# Patient Record
Sex: MALE | NOT HISPANIC OR LATINO | Employment: PART TIME | URBAN - METROPOLITAN AREA
[De-identification: names, ages, dates, MRNs, and addresses within clinical notes are randomized per-mention and may not be internally consistent; named-entity substitution may affect disease eponyms.]

---

## 2018-07-30 ENCOUNTER — TRANSFERRED RECORDS (OUTPATIENT)
Dept: HEALTH INFORMATION MANAGEMENT | Facility: CLINIC | Age: 23
End: 2018-07-30

## 2019-01-09 ENCOUNTER — TRANSFERRED RECORDS (OUTPATIENT)
Dept: HEALTH INFORMATION MANAGEMENT | Facility: CLINIC | Age: 24
End: 2019-01-09

## 2019-08-14 ENCOUNTER — TELEPHONE (OUTPATIENT)
Dept: PSYCHIATRY | Facility: CLINIC | Age: 24
End: 2019-08-14

## 2019-08-14 NOTE — TELEPHONE ENCOUNTER
PSYCHIATRY CLINIC PHONE INTAKE     SERVICES REQUESTED / INTERESTED IN          Med Management    Presenting Problem and Brief History                              What would you like to be seen for? (brief description):  Patient has just recently moved from [place], and is looking to consolidate care closer to home. Patient is currently being medicated for all mental health diagnosis. Patient stated that his current medication management regime is not going that well, he is just needing some one closer to his home. Patient stated that he is still feeling symptomatic, although does not want to take any more medications.       Have you received a mental health diagnosis? Yes   Which one (s): YAMILE, MDD  Is there any history of developmental delay?  No   Are you currently seeing a mental health provider?  Yes            Who / month last seen:  Psychiatrist Deanna Velazquez [facility] [facility]  Do you have mental health records elsewhere?  Yes  Will you sign a release so we can obtain them?  Yes    Have you ever been hospitalized for psychiatric reasons?  No  Describe:  N/A    Do you have current thoughts of self-harm?  No    Do you currently have thoughts of harming others?  No       Substance Use History     Do you have any history of alcohol / illicit drug use?  No  Describe:  N/A  Have you ever received treatment for this?  No    Describe:  N/A     Social History     Does the patient have a guardian?  Yes    Name / number: N/A  Have you had an ACT team in last 12 months?  No  Describe: N/A   Do you have any current or past legal issues?  No  Describe: N/A   OK to leave a detailed voicemail?  No    Medical/ Surgical History                                 There is no problem list on file for this patient.         Medications             No current outpatient medications on file.         DISPOSITION      Patient added to NP & Resident wait list.    Kannan Denney

## 2019-09-09 ENCOUNTER — TELEPHONE (OUTPATIENT)
Dept: PSYCHIATRY | Facility: CLINIC | Age: 24
End: 2019-09-09

## 2019-09-09 ENCOUNTER — OFFICE VISIT (OUTPATIENT)
Dept: PSYCHIATRY | Facility: CLINIC | Age: 24
End: 2019-09-09
Payer: COMMERCIAL

## 2019-09-09 DIAGNOSIS — F42.9 OBSESSIVE-COMPULSIVE DISORDER: Primary | ICD-10-CM

## 2019-09-09 DIAGNOSIS — F33.2 SEVERE EPISODE OF RECURRENT MAJOR DEPRESSIVE DISORDER, WITHOUT PSYCHOTIC FEATURES (H): ICD-10-CM

## 2019-09-09 NOTE — TELEPHONE ENCOUNTER
Writer called patient to explore his willingness to have writer call referring psychologist, Dr. Kayden Tellez, to discuss current treatment plan with that provider and to consult about additional therapy options. Writer also inquired about if patient was open to writer checking in about safety planning with Dr. Tellez.    Patient reported that he was open to writer coordinating care around additional psychotherapy and safety planning.    Patient also asked a clarifying question about what to expect with MTM appointment. Writer explained the activities of that meeting and how the service helps with the assessment process.    Writer will follow up with Dr. Tellez to coordinate care.

## 2019-09-09 NOTE — PROGRESS NOTES
[facility] Treatment Resistant Depression Program  Diagnostic Assessment  A part of the [facility] Psychiatry Mood Disorders Program    Jevon Murillo MRN# [id number]   Age: 23 year old YOB: 1995      Date of Evaluation: 9/09/19  Start Time: 10:00am; End Time: 11:30am         Care Team     PCP- Dr. Nikki Velazquez  Specialty Providers- no  Therapist- yes, Dr. Kayden Tellez  Psychiatrist- yes, Dr. Nikki Velazquez  Other Mental Health Providers- no    Jevon Murillo is a 23 year old male who prefers the name Jevon & pronouns he, him, his.    Referred by:  Dr. Kayden Tellez  Referred for evaluation of:  depression, anxiety and OCD.         Contributors to the Assessment     Chart Reviewed.   Interview completed with Jevon Murillo.  Releases of information signed by Jevon for Dr. Kayden Tellez.  Collateral information obtained from Dr. Nikki Velazquez (printed health records provided by patient).         Chief Complaint     OCD, Anxiety and Depression         History of Present Illness      Pertinent Background:      The patient reports 10-20 lifetime episodes of depression and no psychiatric hospitalizations. Patient reported he is interested in TMS treatment and other treatments to help him with OCD symptoms and subsequent depression. He is hoping to find longer-term non-pharmacological ways to treat his symptoms. His ultimate goal is to be able to manage his symptoms well enough to be able to travel and work abroad.    Patient was first diagnosed with depression at the age of 6 by a primary care provider. He was also diagnosed with Generalized Anxiety Disorder at that time. He notes that depression has occurred intermittently throughout his life and feels driven by the distress he feels related to anxiety and OCD. He notes that depression became more pronounced recently when OCD symptoms returned after he stopped taking Vyvance while working abroad about a year ago.     The patient's depressive  "symptoms currently include increased appetite, psychomotor retardation, exhaustion, feelings of guilt or worthlessness, difficulty concentrating, and thoughts of death. Suicidal ideation is present and has not included a suicide attempt.     Discussed other mental health concerns apart from depression.     With regard to Obssessive-Compulsive Disorder, patient reported onset at age 8. Examples include severe separation anxiety, and obsessions about \"bad behavior\" in the past and compulsions to seek reassurance from his parents. Obsessions and Compulsions have varied and evolved over time. Right now patient describes obsessions related to sexual and harm-based behavior toward children and animals (I.e. Worries about inappropriately petting his cat). His compulsions are more centered around mental review right now. Impairment to functioning as a child was described as difficulty going to school or engaging in activities away from his parents. Right now he is struggling to work, engage with friends, or leave home. Medications have been helpful at various times, but has also experienced several relapses in symptoms throughout his life. He notes that he had a complete remission in symptoms when taking Fluoxitine and Vyvance, and was symptom free for a five year period.    With regard to Panic Disorder, patient reported being diagnosed in 2018. Examples include panic attacks. Impairment to functioning was described as having difficulty working and leaving the house. Patient notes that panic symptoms are directly related to distress he feels as a result of OCD. He describes that panic symptoms will build for days while he experiences OCD symptoms.    With regard to ADD, patient reported being diagnosed at the age of 12. The patient and writer did not discuss the impact or treatment of these symptoms during the interview.    The patient also reports a diagnosis of Tic Disorder, not otherwise specified. This diagnosis was " "also not discussed throughout the interview.    Psych critical item history includes [no critical items].          Psychiatric Review of Systems (Completed M.I.N.I. Version 7.0.2: Yes)     A. DEPRESSION  Past 2 Weeks:  low mood nearly every day, anhedonia most of the time, appetite change (increase), psychomotor changes (retardation), low energy, worthlessness and/or guilt, difficulty concentrating, thinking or making decisions and suicidal ideation with plan, without intent    Past Episode:  low mood nearly every day, anhedonia most of the time, appetite change (patient notes that he has both an increase and a significant decrease.), psychomotor changes (retardation), low energy, worthlessness and/or guilt, difficulty concentrating, thinking or making decisions and suicidal ideation with plan, without intent    B. SUICIDALITY: Current: Yes, risk High  -reports 25-30% in response to \"How likely are to you to try to kill yourself within the next 3 months on a scale from 0-100%?\"  -reports current SI, denies intent and reports passive plan  -denies current SIB/Self Injurious Behavior  -denies current HI    C. ABDULLAHI/HYPOMANIA  Current Episode:  none    Past Episode:  none    D. PANIC:  Patient reports experiencing panic that is provoked by OCD symptoms.    E. AGORAPHOBIA:  Patient notes that he is currently having trouble leaving the house, but this appears to be more of an avoidance of OCD triggers.    F. SOCIAL ANXIETY:  Patient describes having social anxiety symptoms in high school, but that those have mostly resolved.    G. OBSESSIVE-COMPULSIVE:  obsessions, compulsions and examples included Patient has sexual and harm-based obsessions related to children and animals. Most specifically he has fears that he is inappropriately touching his cat. He compulsions have become more centered on mental review, but have been reassurance seeking in the past.    H. TRAUMA:  none    I. ALCOHOL & J. NON-ALCOHOL:  See below    K. " PSYCHOSIS:   Patient wonders if he experienced auditory hallucinations while withdrawing from Vyvance.    L-M. EATING DISORDER: none    N. GENERALIZED ANXIETY:  Patient's symptoms have been diagnoses as YAMILE when he was a child. He does not endorce any generalized anxiety right now.    O. RULE OUT MEDICAL, ORGANIC OR DRUG CAUSES FOR ALL DISORDERS  During any current disorder or past mood episode, patient reports:  A. Substance use or withdrawal: No  B. Medical illness: No    P. ANTISOCIAL PERSONALITY:  none     Other Cluster B Traits:  none    SUBSTANCE USE HISTORY                                                                 RECENT SUBSTANCE USE:     TOBACCO- no     CAFFEINE- coffee/ tea [will take a caffeine supplement before working out, but has not used this in the last two months.]  ALCOHOL- yes, less than monthly.     CANNABIS- yes, less than monthly.    OPIOIDS- no         NARCAN KIT- no                OTHER ILLICIT DRUGS- none    Past Use-   TOBACCO- no     CAFFEINE- see above  ALCOHOL- yes, began drinking in 2013. Does not report present or past problems with alcohol use.     CANNABIS- yes, less than monthly.    OPIOIDS- no         NARCAN KIT- no                OTHER ILLICIT DRUGS- none    CD Treatment- #- no   Most Recent- no    Medical Consequences (eg HIV/Hepatitis)- no  Legal Consequences- no     PSYCHIATRIC HISTORY     SIB- no  Suicidal Ideation Hx- yes, patient has had ongoing SI throughout his life.  Suicide Attempt- #- no, most recent- no    Violence/Aggression Hx- no    Psych Diagnoses- Past Diagnoses include: Generalized Anxiety Disorder and Depression (age 6), OCD (age 8), MDD and ADHD (age 13), Tic Disorder, not otherwise specified (age 18), Panic Disorder (age 22).  Psych Hosp- #- no, most recent- no     ECT- no  TMS- no     Outpatient Programs [ DBT, Day Treatment, Eating Disorder etc] : no, patient reports that he is on the waitlist for the IOP for OCD at [facility] SOCIAL  and FAMILY HISTORY                        patient reported                                 1ea, 1ea     CURRENT SOCIAL HISTORY:  LIVING SITUATION- Patient lives with friends.  CHILDREN- no   SOCIAL SUPPORT / RELATIONSHIPS-   Patient notes that his parents and friends are supports. He started working with a psychologist about 2 months ago.     He states that his parents are the primary people who he feel comfortable sharing his mental health symptoms with.    CULTURAL / SPIRITUAL- none provided    EDUCATION- Patient has a Bachelor's Degree  EMPLOYMENT- Patient works as a  about 20 hours per week. 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    FINANCIAL SUPPORT- Earned income    STRENGTHS / COPING- Patient notes that he uses distraction (I.e. Watching youtube) to help him cope with OCD and the subsequent anxiety and depression. When he is feeling better he tries to engage with his friends and roommates.    Patient is actively working toward recovery by working with his therapist and psychiatric provider.    Trauma History (self-report)- no    Legal- no    FAMILY MENTAL HEALTH HX- yes, his father has a history of Bipolar/Schizoaffective Disorders and ADD; mother has depression, anxiety, and PTSD; oldest brother has Bipolar Disorder; the second oldest brother has OCD and depression; sister has depression, anxiety and ADD.    PAST PSYCH MED TRIALS      Will be reviewed during MTM.    MEDICAL / SURGICAL HISTORY                                   There is no problem list on file for this patient.      No past surgical history on file.     History of seizures: no   History of head trauma/loss of consciousness: no     ALLERGY                                Patient has no allergy information on record.    MEDICATIONS                               No current outpatient medications on file.       VITALS                                                                                                                             3, 3   There were no vitals taken for this visit.     MENTAL STATUS EXAM                                                                                    9, 14 cog gs     Alertness: alert   Appearance: casually groomed  Behavior/Demeanor: cooperative, with good  eye contact   Speech: slowed  Language: intact  Psychomotor: normal or unremarkable  Mood: depressed and anxious  Affect: flat; was congruent to mood; was congruent to content  Thought Process/Associations: unremarkable  Thought Content:  Reports suicidal ideation with plan; without intent [details in Interim History];  Denies none  Perception:  Reports none;  Denies none  Insight: excellent  Judgment: excellent  Cognition: (6) fund of knowledge: appropriate    DATA     PHQ9 was completed today, 9/09/19  Scored at 17    Over the last 2 weeks, how often have you been bothered by any the following problems?    1. Little interest or pleasure in doing things: 3 - Nearly every day  2. Feeling down, depressed, or hopeless: 3 - Nearly every day  3. Trouble falling or staying asleep, or sleeping too much: 1 - Several days  4. Feeling tired or having little energy: 1 - Several days  5. Poor appetite or overeating: 3 - Nearly every day  6. Feeling bad about yourself - or that you are a failure or have let yourself or your family down: Not Answered  7. Trouble concentrating on things, such as reading the newspaper or watching television: 2 - More than half the days  8. Moving or speaking so slowly that other people could have noticed. Or the opposite-being fidgety or restless that you have been moving around a lot more than usual: 2 - More than half the days  9. Thoughts that you would be better off dead, or of hurting yourself in some way: 2 - More than half the days    If you checked off any problems, how difficulty have these problems made it for you to do your work, take care of things at home, or get along with other people? Very difficult     GAD7 was  completed today, 9/09/19  Scored at 15    Over the last 2 weeks, how often have you been bothered by the following problems?    1. Feeling nervous, anxious or on edge: 3 - Nearly every day  2. Not being able to stop or control worrying: 3 - Nearly every day  3. Worrying too much about different things: 3 - Nearly every day  4. Trouble relaxing: 3 - Nearly every day  5. Being so restless that it is hard to sit still: 0 - Not at all  6. Becoming easily annoyed or irritable: 0 - Not at all  7. Feeling afraid as if something awful might happen: 3 - Nearly every day     CAGE-AID was completed today, 9/09/19  1. In the last three months, have you felt you should cut down or stop drinking or using drugs? no  2. In the last three months, has anyone annoyed you or gotten on your nerves by telling you to cut down or stop drinking or using drugs? no  3. In the last three months, have you felt guilty or bad about how much you drink or use drugs? no  4. In the last three months, have you been waking up wanting to have an alcoholic drink or use drugs? no    WHODAS 2.0 was completed today, 9/09/19  Scored at 25    Over the past 30 days, how much difficulty you had doing the following activities.    S1. Standing for long periods such as 30 minutes? None  S2. Taking care of your household responsibilities? Moderate  S3. Learning a new task, for examples, learning how to get a new place? Severe  S4. How much of a problem did you have joining in community activities (for example, festivities, religous or other activities) in the same way as anyone else can? Severe  S5. How much have you been emotionally affected by your health problems? Severe  S6. Concentrating on doing something for ten minutes? Moderate  S7. Walking a long distance such as a kilometre (or equivalent)? Mild  S8. Washing your whole body? Moderate  S9. Getting dressed? Mild  S10. Dealing with people you do not know? Moderate  S11. Maintaining a friendship?  Severe  S12. Your day-to-day work? Severe    H1. Overall, the past 30 days, how many days were these difficulties present? [30]  H2. In the past 30 days, for how many days were you totally unable to carry out your usual activities or work because of any health condition? [15]  H3. In the past 30 days, not counting the days that you were totally unable, for how many days did you cut back or reduce your usual activities or work because of any health condition? [30]     PSYCHIATRIC DIAGNOSES                                                                                               OCD, Depression     ASSESSMENT                                                                                                          m2, h3     Please note, writer did receive all pertinent medical records as of the time of this assessment. Jevon did sign DAMIÁN's for additional records.     Jevon Murillo is a 23 year old single (never )  male with a psychiatry history of Generalized Anxiety Disorder; Major Depressive Disorder; OCD; Panic Disorder; ADD; Tic Disorder, NOS who presents for a [facility] Treatment Resistant Depression program evaluation. Jevon was referred by Dr. Kayden Tellez. He has a history of no psychiatric hospitalization(s).  Family history is significant for Schizoaffective Disorder, Bipolar Disorder, OCD, ADD, depression, and anxiety.    Today, Jevon presents as a Good historian with Good insight. He estimates 10-20 lifetime episodes of depression with onset at age 6. Jevon s past and present depressive symptoms seem consistent with a diagnosis of Major Depressive Disorder, and Obsessive-Compulsive Disorder. Depressive symptoms seem to contribute to impaired functioning in the areas of work and relationships. Precipitating factors are not clear at this time. Perpetuating factors are not clear at this time. Jevon is presently participating in individual therapy to address OCD symptoms with interest in an  "Intensive Out Patient program for OCD. Jevon is also interested in TMS. Past treatment approaches include individual therapy.     In addition, the M.I.N.I. Interview scores positively for a diagnosis/diagnoses of Major Depressive Disorder, Recurrent, Severe; Suicidality, and Obsessive-Compulsive Disorder. Substance use does not seem to be a current problem. Further diagnostic clarification is needed to rule out diagnosis(es) of Panic Disorder and Generalized Anxiety Disorder.     Today, Jevon reports SI, denies intent, and reports plan. He has notable risk factors for self-harm including suicide plan [Jevon says that his SI includes a plan to use opiates to overdose. He also reports that he would also consider \"dropping out of society and becoming homeless\"].  However, risk is mitigated by no h/o suicide attempt, no h/o risky impulsive behavior, no access to lethal means, describes a safety plan, h/o seeking help when needed, none to minimal alcohol use , commitment to family, stable housing and good job situation.  Based on all available evidence he does not appear to be at imminent risk for self-harm therefore does not meet criteria for a 72-hr hold/  involuntary hospitalization.  However, based on degree of symptoms, safety planning was provided and writer voiced a desire to contact patient's therapist to discuss risk and additional referrals, which patient agreed to.  Additional steps to minimize risk include: SAFETY PLAN completed includes regular communication with psychologist (even outside of session regarding SI), contacting parents, identifying triggers and signs of risky thoughts or behaviors. Writer provided address for [place] [facility] emergency room. 24/7 crisis resources were provided in print.      PLAN                                                                                                                        m2, h3     Medications: Will be addressed during an MTM visit and/or new patient " medication management visit.     Therapy:  yes, OCD-specific exposure therapy with Dr. Kayden Tellez.    Crisis Numbers:   Provided 24/7 crisis resources including but not limited to the following:  National Suicide Prevention Hotline: 5-674-132-XQLI (8963)  Crisis Text Line: Text START to 896-391  [facility]): Dial 2-1-17 (428-141-3740)    Other Referrals:  After initial visit writer contacted patient for additional safety planning and assessed his willingness to attend day treatment program while he waits for [facility] program to open. Patient said that he was not interested in a referral to that program at this time.    RTC: For MTM visit.    JAMESON Calderón     [Billing Code 85585 for diagnostic assessment without medical services]       Attestation:    I did not see this patient directly. This patient is discussed with me in individual clinical social work supervision, and I agree with the plan as documented.     Desiree Mota, LICSW, MSW, LICSW, October 10, 2019

## 2019-09-19 ENCOUNTER — TELEPHONE (OUTPATIENT)
Dept: PSYCHIATRY | Facility: CLINIC | Age: 24
End: 2019-09-19

## 2019-09-19 NOTE — TELEPHONE ENCOUNTER
Writer called patient to provide referral to [facility] for OCD and Anxiety treatment. Patient stated that he is already on the waitlist for the program, but is concerned that it may interfere too much with work. Writer offered to refer patient to day treatment at [facility] in the meantime. Patient declined.  Writer also inquired about patient's SI. Patient stated that he communicated with his therapist about increasing SI related to exposure therapy that he is working on. He stated that SI has decreased in the last week since his therapist has decreased the intensity of the OCD intervention. Writer inquired about the safety plan that patient has in place.    Safety plan includes talking to his parents if he begins to do completion tasks associated with suicide (I.e. Selling his car; paying off debt), and he also knows to go to the psychiatric emergency room if needed.    Writer probed for details related to means that he may have access to. Patient reported that he does not have access to means. Writer probed about access to other potential means. Patient stated that he has access to benzodiazepines that he is prescribed but does not use. Writer asked if he would be willing to get rid of that medication. He agreed to throw them away today. Writer also encouraged patient to talk to his parents about his completion tasks and to let them know that if he is engaging in behaviors that resemble completion tasks that he is likely at high risk. Patient agreed to do this. Patient also described ways that he is able to distract himself from SI (I.e. Youtube and walking outside). He noted that SI has decreased in the last week and he has been able to work more, and has been leaving the house more. He said that he planned to spend time with his roommates and play video games this weekend.    Writer reminded patient of appointment with pharmacist next week. Writer will work to expedite care with psychiatrist if  possible due to acuity of symptoms.

## 2019-09-20 ASSESSMENT — PATIENT HEALTH QUESTIONNAIRE - PHQ9: 5. POOR APPETITE OR OVEREATING: NEARLY EVERY DAY

## 2019-09-20 ASSESSMENT — ANXIETY QUESTIONNAIRES
5. BEING SO RESTLESS THAT IT IS HARD TO SIT STILL: NOT AT ALL
7. FEELING AFRAID AS IF SOMETHING AWFUL MIGHT HAPPEN: NEARLY EVERY DAY
6. BECOMING EASILY ANNOYED OR IRRITABLE: NOT AT ALL
3. WORRYING TOO MUCH ABOUT DIFFERENT THINGS: NEARLY EVERY DAY
2. NOT BEING ABLE TO STOP OR CONTROL WORRYING: NEARLY EVERY DAY
1. FEELING NERVOUS, ANXIOUS, OR ON EDGE: NEARLY EVERY DAY
GAD7 TOTAL SCORE: 15

## 2019-09-21 ASSESSMENT — ANXIETY QUESTIONNAIRES: GAD7 TOTAL SCORE: 15

## 2019-09-25 ENCOUNTER — OFFICE VISIT (OUTPATIENT)
Dept: PSYCHIATRY | Facility: CLINIC | Age: 24
End: 2019-09-25
Payer: COMMERCIAL

## 2019-09-25 VITALS
WEIGHT: 182 LBS | DIASTOLIC BLOOD PRESSURE: 58 MMHG | SYSTOLIC BLOOD PRESSURE: 118 MMHG | TEMPERATURE: 98.1 F | HEART RATE: 73 BPM

## 2019-09-25 DIAGNOSIS — F42.9 OBSESSIVE-COMPULSIVE DISORDER: Primary | ICD-10-CM

## 2019-09-25 DIAGNOSIS — F33.2 SEVERE EPISODE OF RECURRENT MAJOR DEPRESSIVE DISORDER, WITHOUT PSYCHOTIC FEATURES (H): ICD-10-CM

## 2019-09-25 ASSESSMENT — PAIN SCALES - GENERAL: PAINLEVEL: NO PAIN (0)

## 2019-09-25 ASSESSMENT — PATIENT HEALTH QUESTIONNAIRE - PHQ9: SUM OF ALL RESPONSES TO PHQ QUESTIONS 1-9: 14

## 2019-09-26 NOTE — PROGRESS NOTES
"Service Date: 2019      MEDICATION THERAPY MANAGEMENT FOR  PHYSICIAN TRD PROGRAM      DICTATION IDENTIFIER:  Jevon Murillo   : 1995   MRN: [id number]      DATE OF VISIT:  2019      IDENTIFYING INFORMATION AND INTRODUCTION:  Jevon is a 23-year-old .  He is a male seen in clinic today for an  Physicians [facility] consultation.  He lives in [place], [place].  He is a new adult to the  Physician TRD program.    Psychiatrist is Dr. Bryna Hughes.  He will be seen on 10/25/2019.      CHIEF COMPLAINTS:  Depression, anxiety and OCD.      REASON FOR CONSULTATION:  Rating medication trials for antidepressant failure and assessment of antidepressant drugs and their history.      Informants include the patient.      Time spent was 1 hour without the patient and 1 hour with the patient.      MEDICATION INFORMATION:   1.  Current Antidepressant Medications:   a.  Fluoxetine, generic, 60 mg in the morning.   b.  Lorazepam 0.5 mg p.r.n.  He took it in high school and he got it last year and he threw it away. \" He did not want to get addicted\".      2.  Concomitant Medications:   a.  Ibuprofen p.r.n.      3.  Herbal Remedies:   a.  Fish oil concentrate 2 capsules 3 times a day.   b.  B complex.   c.  Vitamin C 1000 mg twice a day.   d.  Inositol powder.       4.  Currently Reported Side Effects:   a.  A little nausea and a little headache, but it is fine.      The patient had Gene testing done 18 scanned in 19 media.      ALLERGIES:  No known drug allergies.      PAST MEDICAL HISTORY:   1.  Depression.   2.  OCD.   3.  Anxiety and panic attacks.   4.  Tic diagnosis, but he says it is actually OCD.   5.  Irritable bowel syndrome.   6.  Migraine headaches.   7.  ADHD in high school.      DEPRESSION HISTORY:   1.  The patient might have had more than 20 lifetime episodes of depression.   2.  First time experienced depression and anxiety around age 6.   3.  First time OCD experienced " "at age 8.   4.  Panic attack, diagnosed in 2018.   5.  First time antidepressant drugs were started:  After a diagnosis at age 13-14.  At the same time also ADHD was diagnosed.   6.  Last episode started in [facility] around 06/2018, 2 weeks after a fast Vyvanse taper.  The patient did not have medication there and had to taper it during 2-week period and in 06/2018, he got exacerbation of his depression and OCD and each month it got worse and 2 weeks ago, he really hit bottom.  Being off Vyvanse unleashed a new OCD obsession and it is a sexual, mental obsession that he says is so horrible; it is much worse than while on Vyvanse.  His OCD was checking himself, handwashing, and it is even not comparable now.   7.  Hospitalization for severe suicidal ideation or suicide attempts:  No.   8.  Currently suicidal ideation:  Yes, some passive plans, but nothing really actively.   For more, see Rebekah Núñez's dictation.   9.  CBT:  He had \"yes,\" it was effective, \"yes\" for the OCD, but the depression got worse.   10.  He had different psychotherapies for 10 years , between the age of 9-18.      SOCIAL AND ENVIRONMENTAL ASPECTS:  He lives with his roommates and their 2 cats.  His roommates are from high school and one is from elementary school.      PHARMACOTHERAPY INDICATORS:   1.  The patient has prescription coverage with his insurance plan.  Prescription drug co-payment is 20%.  When his prescriber prescribed Luvox, even the fluvoxamine, the generic, his insurance did not cover because, according to them, patient has not failed 2 more SSRIs and this is not true.  The patient has failed 2 SSRIs and that is correct for depression, but for OCD, fluvoxamine is actually the drug of choice.  Because of that, he increased his Prozac to 60 mg and has not started fluvoxamine.  Cost of obtaining prescribed medication does interfere with compliance, if it is more than $100 per month.     2.  Patient's pharmacy:  [facility] in [address].   3.  Compliance Assessment:  Shows the patient is independent in medication administration.  He is a good historian.  He does not use a pillbox.  He misses medication rarely.  His parents are available to assist the patient.             HABITS AND CHEMICAL USE:   a.  Alcohol:  He has not used anything for 1-1/2 months, but he is kind of a social  binge drinker.  He started in 2013.  During that time, he would have 8-10 beers in one sitting.  Now if he drinks, he might have 4-5 beers, but as said for 1-1/2 months, he is alcohol free and I told him for TMS, he needs to be off the alcohol and he says it would not be a problem.   b.  Tobacco:  Never.   c. Caffeine:  He will have 120 mg caffeine supplement before a workout, and too much will give him an increase in anxiety.  He has had nothing for 2 months.   d.  Recreational drugs:  He smoked marijuana 3 months ago.  Sometimes it calms, sometimes anxiety is increased, 1-2 per year now.  In college, he would smoke 5-6 times per year.  Never medical marijuana.      RATING OF ANTIDEPRESSANT DRUGS:    1.  Selective serotonin reuptake inhibitors (SSRIs):   a.  Escitalopram/Lexapro:  Yes, 06/2009-02/2010.  Max dose 20 mg per day.  It decreased anxiety, but increased OCD.  Rating, would give it a 4 for depression.     b.  Fluoxetine/Prozac:  Yes, 03/2010 to present.  Max dose 60 mg per day.  It did not decrease  OCD or anxiety.  It decreased depression.  For OCD, it could be pushed up to 80, but target dose is 60 and so far it has not done anything for his OCD.     c.  Paroxetine/Paxil:  It was the first drug ever used as a child in 02/2003 until 06/2003.  It was beneficial for anxiety, but not for depression, and it was discontinued due to black box warning that it might increase suicidal thoughts.     d.  The patient was off medication from 06/2003 until 06/2009.      2.  Stimulants:     a.  Concerta:  Yes, 7612-3459.  No change.     b.   Dextroamphetamine-amphetamine/Adderall:  Yes.  Max dose 30 mg per day.  It was too strong for the patient.  He got hyper and his anxiety went up.     c.  Lisdexamfetamine/Vyvanse.  Max dose 40 mg per day between 9198-2571.  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  He says it is a different kind of OCD and it is much more scary and awful than his locking up things and washing his hands.  The patient did actually very well on Prozac and Vyvanse for 6 years.  His anxiety now off Vyvanse is worse.      3.  Benzodiazepines:     a.  Lorazepam:  Yes.  0.5 mg tablets up to 3 times a day.  It was given to him in 2010 and again in 2018.  He took it in 2010 twice a day every other day, and it made him tired but also relaxed.  When it was given to him last year, he threw it out because he did not like the tiredness and getting hooked on something.      4.  Miscellaneous:   a.  Buspirone/BuSpar:  Yes.  Max dose 30 mg per day.  It was used in 2010.  No data.     b.  Omega 3, triglycerides, fish oil.  Still using it.      5.  Miscellaneous sleep aids:   a.  Melatonin in 2013.  Used during college days and it worked.      The patient normally goes into the gym, but he hurt his shoulder, so he is currently not having workouts.      Bright lights:  Yes, they do not work for him.  He gets more depressed in the winter.      COMMENTS:  The patient is interested in TMS, and he also wanted to know if insurance would not cover it, what would be the price for it.      The patient will be seen by Dr. Bryan Hughes for recommendation and future treatment options.      Thanks for the opportunity to participate in the care of this patient.      Cortney Castillo, Zhane   Pharmaceutical Care Coordinator         CORTNEY CASTILLO, ZHANE             D: 09/26/2019   T: 09/26/2019   MT: al      Name:      CONRADO, GABY   MRN:      [id number]        Account:      [id number]   :      1995           Service Date: 2019      Document: [id number]

## 2019-10-25 ENCOUNTER — MEDICAL CORRESPONDENCE (OUTPATIENT)
Dept: HEALTH INFORMATION MANAGEMENT | Facility: CLINIC | Age: 24
End: 2019-10-25

## 2019-10-25 ENCOUNTER — OFFICE VISIT (OUTPATIENT)
Dept: PSYCHIATRY | Facility: CLINIC | Age: 24
End: 2019-10-25
Payer: COMMERCIAL

## 2019-10-25 VITALS
TEMPERATURE: 98 F | BODY MASS INDEX: 23.7 KG/M2 | HEART RATE: 74 BPM | DIASTOLIC BLOOD PRESSURE: 76 MMHG | WEIGHT: 178.8 LBS | HEIGHT: 73 IN | SYSTOLIC BLOOD PRESSURE: 122 MMHG

## 2019-10-25 DIAGNOSIS — F42.2 MIXED OBSESSIONAL THOUGHTS AND ACTS: Primary | ICD-10-CM

## 2019-10-25 ASSESSMENT — MIFFLIN-ST. JEOR: SCORE: 1854.91

## 2019-10-25 ASSESSMENT — PATIENT HEALTH QUESTIONNAIRE - PHQ9: SUM OF ALL RESPONSES TO PHQ QUESTIONS 1-9: 18

## 2019-10-25 ASSESSMENT — PAIN SCALES - GENERAL: PAINLEVEL: NO PAIN (0)

## 2019-10-25 NOTE — PROGRESS NOTES
" [facility]  [address], [address]  [place] [address]  New Patient Evaluation       Jevon Murillo is a 23 year old male who prefers the name Jevon and pronoun he, him, his.  Psychiatrist: MAXIME Masters  Therapist: Kayden Tellez  PCP: No primary care provider on file.  Referred by  Kayden Tellez for evaluation of depression, anxiety and OCD     History was provided by patient who was a good historian.  I also reviewed diagnostic assessment by Rebekah Rock and MTM review by Cortney Castillo.      Chief Complaint                                                                                                        \" We should talk about TMS \"     History of Present Illness                                                                                4, 4      Pertinent Background and Present Symptoms:  He thinks today his symptoms are a 6-7/10 worst (compared to worst of his life).  Feels that they limit his social interaction. Is able to work, but only at parts of the day when sx are not as bad. He reports that closer to 8 pm he feels more free and slightly better.    Primary obsessions:   Initially, concerns about having done something wrong, at age 8.  Currently \"patient describes obsessions related to sexual and harm-based behavior toward children and animals (I.e. Worries about inappropriately petting his cat.\". On the YBOCS-checklist, he endorsed ongoing contamination symptoms as well.  Can tend to get about an hour at a time without an intrusive thought.    Primary compulsive behaviors:  Initially, reassurance seeking.  Currently, \"his compulsions are more centered around mental review.\" For instance, remembering how he did something to ensure that he did not touch someone/animal inappropriately. These can take 60 minutes for one ritual. These rituals sounds like needing to get things just so or just right in his head. He described getting up and first thing sitting " in bed and doing one of these rituals for 25-30 minutes everyday. He says work helps distract but he has to get back to these rituals sometime after 1 hour of getting some work done. It also appears that he avoids situations, like he does not visit his sister who has pet cats.    Acknowledges parental reassurance-seeking.  He will avoid some actions, such as being around the cat.  Tends to ritualize pre-emptively to be able to begin tasks.  Says he would spend > 4-5 25-30 minute sessions of performing these mental rituals.     Exposure work:  Currently working with Moncho Tellez, private practice in [facility], who is ERP-trained. Has been doing imaginal exposure work, some in vivo (e.g., deliberately touching the cat).  Working together for ~4 months. He feels his SUDs generally go to about an 8/10 during these exercises, including in-office exercises.  Has found this difficult b/c the target of his obsession tends to switch.  He is on an IOP waitlist at [facility].    Past medication work:    Symptoms worsened when he stopped stimulants (Vyvanse) and may have been largely gone for a period witth fluoxetine+Vyvanse. He does carry an ADHD diagnosis, though he has never been sure if the inattention might not be from OC distraction.  When [place] came back to [place], he restarted Vyvanse but found that sx were worse again, thinks this is because nature of his OC sx got worse.  Wanted to try fluvoxamine but insurance rejected it with a claim that he had not tried less expensive medications.   This was rx'ed by an ARNP.  S[facility], of [facility], prior to [facility]. We received records from them this morning. She is retiring from practice.  He has tried going up to 60 on fluoxetine, depression has gotten better, but OC sx have not.  He reports that his depression is bad when his OC symptoms cause him lot of distress. He says that when OC symptoms are better his mood is better too.  He does not have a pervasive  "anhedonia and can enjoy activities when distracted and does try and have interests though is limited in social activities due to severity of his obsessions and need to do compulsions.   Wiling to consider medication changes but for now is more interested in TMS.    Psychiatric Review of Symptoms:   Depression: Reports depressive sx since age of 6, which overlap a lot with YAMILE sx.  This is described as episodic, driven by changed in OCD sx.  Per his DA, \"increased appetite, psychomotor retardation, exhaustion, feelings of guilt or worthlessness, difficulty concentrating, and thoughts of death. Suicidal ideation is present and has not included a suicide attempt. \"  At the time of his DA, Ms Rock was concerned about his level of suicidality and took steps to remove means (throwing away old stockpile of BZDs.) Pt had a sense of tasks he would need to complete before a suicide attempt (getting his affairs in order), and identified these as danger signs.  Today he is similar to as during his DA -- frequent suciidal thoughts, though not necessarily with means (e.g., thinking about OD'ing on heroin).  He notes that he does not feel as depressed when he is not engaged in an OCD thought.  Endorses panic secondary to OCD.    Ctahi: Negative  Psychosis: Negative   Homicidal Ideation/Violence/Aggression: Negative         Recent Substance Use:  TOBACCO- no     CAFFEINE- coffee/ tea [will take a caffeine supplement before working out, but has not used this in the last two months.]  ALCOHOL- yes, less than monthly.     CANNABIS- yes, less than monthly.    OPIOIDS- no         NARCAN KIT- no                OTHER ILLICIT DRUGS- none           Substance Use History     Past Use- largely consistent with current  Treatment [#, most recent]- None    Medical Consequences [withdrawal, sz etc]- None   HIV/Hepatitis- None    Legal Consequences- None       Psychiatric History     Suicidal ideation- Yes, chronic   Suicide Attempt:   #- 0   " Most Recent- 0  SIB- None   Psych Hosp- None          Psychiatric Medication Trials       Drug Duration (mos) Dose (mg) Helpful (Y/N) Adverse Effects DC Reason / Date   fluoxetine >12 60 For depression  Current med   escitalopram >6 20 For anxiety, not OCD     paroxetine ~4  For anxiety, not OCD  Black box warning of possible SI (did not get SI - parents were worried)                                           buspirone short 30 daily unkn     lorazepam rare 0.5 TID partial sedation sedation                   Adderall  30  Hyper, anxiouys    Concerta >6  N     Vyvanse >50 39 Y  Was in [facility] without it     At times in the past he has responded to these medications, there are some periods in the notes (mostly around 2907-8403) when sx appeared well controlled on serotonin specific reuptake inhibitor.        Social/ Family History               [per patient report]                                                 1ea, 1ea     LIVING SITUATION- Patient lives with friends.  CHILDREN- no   SOCIAL SUPPORT / RELATIONSHIPS-   Patient notes that his parents and friends are supports. He started working with a psychologist about 2 months ago.      He states that his parents are the primary people who he feel comfortable sharing his mental health symptoms with.     CULTURAL / SPIRITUAL- none provided     EDUCATION- Patient has a Bachelor's Degree  EMPLOYMENT- Patient works as a  about 20 hours per week. 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 Before this, was living in [facility].      FINANCIAL SUPPORT- Earned income     STRENGTHS / COPING- Patient notes that he uses distraction (I.e. Watching youtube) to help him cope with OCD and the subsequent anxiety and depression. When he is feeling better he tries to engage with his friends and roommates.     Patient is actively working toward recovery by working with his therapist and psychiatric provider.     Trauma History (self-report)- no     Legal- no     FAMILY MENTAL  "HEALTH HX- yes, his father has a history of Bipolar/Schizoaffective Disorders and ADD; mother has depression, anxiety, and PTSD; oldest brother has Bipolar Disorder; the second oldest brother has OCD and depression; sister has depression, anxiety and ADD.       Medical / Surgical History   There is no problem list on file for this patient.      No past surgical history on file.      Medical Review of Systems                                                                                                 2, 10         Metals Screen   Yes No Item    X Implanted or lodged metals in body    X Implanted surgical devices    X Metal containing facial or scalp tattoos    X Non removable piercings   Seizure Screen  Yes No Item    X Current Seizure Disorder?    X History of Seizure?     Does patient have a cochlear implant? ___N_______  Does patient have any shunts?___N________  Does patient have a pacemaker?___N_______  Does patient have a vagus nerve stimulator?___N_______  Does patient have a deep brain stimulator?____N______  Any other implanted device?________N________       Allergy   No known allergies     Current Medications     Current Outpatient Medications   Medication Sig Dispense Refill     FLUOXETINE HCL PO 60 mg daily        INOSITOL PO        MELATONIN PO Take by mouth nightly as needed       Omega-3 Fatty Acids (FISH OIL PO)           Vitals                                                                                                                        3, 3     /76 (BP Location: Right arm, Patient Position: Sitting, Cuff Size: Adult Regular)   Pulse 74   Temp 98  F (36.7  C)   Ht 1.854 m (6' 1\")   Wt 81.1 kg (178 lb 12.8 oz)   BMI 23.59 kg/m        Mental Status Exam                                                                                   9, 14 cog gs     Alertness: alert  and oriented  Appearance: well groomed  Behavior/Demeanor: cooperative, pleasant, calm and hostile, with fair  eye " contact . Note frequent facial grimacing and eye blinking at times - does not report that these are compulsive. No other tics noted.   Speech: increased latency of response and slowed  Language: intact  Psychomotor: normal or unremarkable  Mood: depressed and anxious  Affect: restricted; was congruent to mood; was congruent to content  Thought Process/Associations: unremarkable  Thought Content:  Reports preoccupations, obsessions  and magical thinking;  Denies suicidal ideation, violent ideation and delusions  Perception:  Reports none;  Denies auditory hallucinations and visual hallucinations  Insight: excellent  Judgment: fair  Cognition: (6) does  appear grossly intact; formal cognitive testing was not done  Gait and Station: unremarkable     Labs and Data     Rating Scales:    PHQ9 and YBOCS    YBOCS Today: 26-29 depending on severity and impairment especially for mental rituals which he says varies from day to day. Does appear to have mixed OCD with Obsessions mildly greater than compulsions O-15, C-14 when worst    PHQ9 Today:  18  PHQ-9 SCORE 9/25/2019 10/25/2019   PHQ-9 Total Score 14 18       Diagnosis and Assessment                                                                             m2, h3     Jevon Murillo is a 23 year old male with previous diagnosis of OCD, MDD and ADHD who presents for a diagnostic opinion and discussion of therapeutic options.     Diagnostically,  OCD is clearly an appropriate diagnosis and probably his primary.  Jevon has a strong family history of mood disorders (unipolar and bipolar), OCD, substance use disorder and suicide, does not have history of significant developmental issues though reports reviewed from time he was in middle school and high school suggest a high stress environment and struggling with friends as his OCD manifested. He has not reported other traumatic life events though.   He has long history of OCD, onset around age 8-9 years with fairly chronic and  constant course, some fluctuations in severity of symptoms and change in themes with past aggressive, contamination, symmetry based obsessions and compulsions involving cleaning/washing, repeating rituals, counting and ordering, reassurance seeking, repeated questioning, repeated touching and blinking in the past with reported predominantly more obsessions than compulsions now, though on checklists still has mixed picture, with newer sexualized obsessions and checking compulsions also reported. He also has significant low mood which he reports secondary to OCD but severe in nature and has even reported suicidal ideation. OCD does not appear to be state dependent as such though. No suggestion of manic episodes at anytime and no report of psychosis or other anxiety syndrome/PTSD symptoms/ Phobias or suggestion of organic etiology, with no seizures or head injury. Comorbid tics had been noted in the past with possible hand movements but patient is unsure of them and independent chronic vocal and/ or motor tic disorder was not elucidated. Biological functions of sleep and appetite have been normal. Substance use has not been a major part of the presentation. MSE today was consistent with egodystonic repetitive thoughts and compulsions with some ability to resist, good insight with secondary dysphoria and currently anxious and affect with decreased range and reactivity, some faciial grimacing and increased blinking, with no perceptual abnormalities or psychosis and intact higher mental functions.   Diagnosis was consistent with Obsessive compulsive disorder - mixed type with consideration of treatment resistant OCD with failure of > 2 anti-obsessive agents for > 8-12 week trials and > 20 hrs of ERP with scores on YBOCS continuing to be high 26-29.  He also has depression which is characterized as Depression NOS with OCD symptoms being more severe at this time.         Impairment does appear severe with > 2-3 hrs of  impairment secondary just to his mental rituals, interruptions to daily work and avoidance of social situations and worsening isolation and relationship developments.       Regarding medication trials, He has tried multiple serotonergic medications (Paroxetine, Escitalopram and Fluoxetine) but not in combination with dopamine antagonists. Lithium has also been tried in the past. The doses and durations of these trials appear to have been adequate, including trials of medications at highest tolerable doses which is still being pursued with Fluoxetine. Patient states concerns about antipsychotic augmentation with metabolic side-effects of antipsychotics and with long term risk of same possibly higher than direct benefits.    Exposure therapy appears to have been conducted by a well trained therapist and he appears to have been appropriately engaged, with around 2 months of therapy at this time. He has responded to ERP in the past.  There is minimal reported improvement with SUDs still at 7-8 and no further decreases during exposure noted, I.e. he is in the classic stuck point.    We discussed the role of higher dose SSRIs and consideration of Clomipramine as well. We discussed increasing Fluoxetine to 100 mg per day though patient reports poor efficacy now. We also discussed planning with primary psychiatrist to consider Clomipramine trial > 150 mg. Patient has shown response to medication in the past based on record review though now appears to be resistant to medications and has also a significant depression and suicidality.       Based on the above, neuromodulation and possibly even neurosurgical options are indicated at this time.   They are an appropriate choice with :  Based on a YBOCS  > 19, plus adequate medication trials and documented ability to engage in exposure therapy, rTMS with the H7 coil is a reasonable next therapeutic step.  With severe YBOCS (at times been >28) and failure of medications as  described above, if he continues to fail ongoing strategies including high dose serotonin specific reuptake inhibitors and then Clompiramine, with ERP tried and TMS being planned, he would be appropriate forsurgical treatment (deep brain stimulation or cingulotomy) in the future. He is quite interested in this option.    The risks, benefits, alternatives and potential adverse effects of the above have been explained and are understood by the patient. We discussed response rates with TMS and need for optimizing with further pharmacological and continued evidence based non-pharmacological therapies. We discussed that if TMS, he will need to have an exposure situation for protocols with higher SUDs during that exposure. He is currently on ERP and does demonstrate adequate understanding and has had a good trial so far though minimal improvement.   Our next steps are planning insurance check for prior authorization.    Jevon Murillo agrees to the treatment plan with the ability to do so. The pt knows to call the clinic for non-acute problems, but to access emergency resources in case of a crisis.  Medical considerations relevant to treatment are: No active medical concerns.  Substance concerns relevant to treatment are: Substance use is not a significant part of his condition. Not on benzodiazepines now which had been used in the past as anxiolytic.     During the course of these treatments, the patient will be asked not to make any medication changes. However, while we are waiting for that to start, I have strongly encouraged him to pursue additional medication options detailed below.    Suicide Risk Assessment:  Today Jevon Murillo reports that he has periods where he feels intensely depressed and has thoughts of wanting to die, though he does not feel that way today. He has thought about overdosing on heroin in the past but does not have any plans of suicide at this time. In addition, he has notable risk factors for  self-harm, including feels trapped, lives alone/ isolated, severe anxiety and male. However, risk is mitigated by no h/o suicide attempt, no plan or intent, no h/o risky impulsive behavior, no access to lethal means, describes a safety plan, h/o seeking help when needed, symptom improvement, future oriented, feeling hopeful, none to minimal alcohol use , commitment to family, stable housing and good job situation. He has also removed medication stock pile recently. Therefore, based on all available evidence including the factors cited above, he does not appear to be at imminent risk for self-harm, does not meet criteria for a 72-hr hold, and therefore involuntary hospitalization will not be pursued at this  time.No change in level of care was recommended. Additional steps taken to minimize risk include plan for optimizing medications that would address distressing OCD symptoms and Depression and considering strategies like Neuromodulation to target severe resistant symptoms and close collaborative care with primary providers.       [place] Prescription Monitoring Program [] review was not needed today.    PSYCHOTROPIC DRUG INTERACTIONS: none clinically relevant    Plan                                                                                                                     m2, h3     1) Obsessive compulsive disorder - mixed obsessions and compulsions      Depression NOS - moderate to severe    -- Medications:     Consider increase in Fluoxetine to higher doses where response in patients with refractory OCD is more likely. Would consider step wise increase to 80 mg then 100mg a day, watching for serotonin syndrome.  Also discussed switching to another class with consideration for Clomipramine, at therapeutic doses > 150 mg/ day which patient has not been on an adequate trial of.   Either of these could/should be augmented with anti-dopamine agent (haloperidol is my preference but any is fine) if needed.    --  Psychotherapy: Continue regular individual psychotherapy, continue ERP with Dr Tellez. Discussed need for continuation of ERP protocols in order for TMS.      -- Procedures:     As discussed above patient is a good candidate for TMS and we recommend proceeding with plan for TMS at this time. Follow up with insurance authorization for procedure. Patient also has significant depressive symptoms which may also resolve with treatment and possible neuromodulation.     -- Referrals: None    2) Follow up with Primary psychiatrist for discussion on medication changes with our recommendations.     3)  RTC: As needed to begin TMS.      CRISIS NUMBERS:   Provided routinely in AVS.    Treatment Risk Statement:  The patient understands the risks, benefits, adverse effects and alternatives. Agrees to treatment with the capacity to do so. No medical contraindications to treatment. Agrees to call clinic for any problems. The patient understands to call 911 or go to the nearest ED if life threatening or urgent symptoms occur.        PROVIDER:  Bryan Hughes MD. Case was seen also by resident Pritesh Gordon MD, PGY 2 who helped in completion of the note.  Pritesh Gordon MD, PGY2 psychiatry resident    Time based billing; 60min spent face to face with the patient with greater than 50% of time spent on  coordination of care, counseling and discussion of treatment alternatives.      Additionally, 30 minutes were spent before the visit in review of extensive medical records and treatment history (CPT code 12788).

## 2019-10-25 NOTE — PATIENT INSTRUCTIONS
I will begin the process of authorization for TMS. This has two steps: getting approval from insurance and being on the wait list for TMS in our clinic.  The first step is two weeks in the simple case. If your insurance denies and we have to appeal, it could take months.  The second is waiting for an open treatment slot. When we have one, someone will call you. If you cannot take that specific time, you will go back on the top of the waiting list.    It can take several weeks on the waiting list when our service is busy. You are always welcome to call or send a MyChart to ask about status updates.  The waiting process can be long and frustrating when you have this severe a symptom level. IF YOU ARE GETTING WORSE, CALL A CRISIS NUMBER (below) OR GO TO AN EMERGENCY ROOM. TMS is not an emergency treatment, and the first priority is keeping you stable while we are getting set up.          CRISIS GENERAL NUMBERS   [phone] ([phone])  OR  [phone]     CRISIS INTERVENTION TEAM (CIT) - this is a POLICE UNIT, specially trained.  This website has information for all of [place]'s CITs. Lays out which areas have this team.  Http://cit.List of hospitals in [email or web address]/[place].php  However, one can just call 911 and ask for this special team.   If police need to be called, DO ask for this team.    CRISIS MOBILE TEAMS  [and see end of this phrase*]  [facility] 24[facility]rs/[facility]:    [phone]  (Adults > 17yo)    [phone]  (Child   < 16yo)                                       FRONT DOOR (during the day could call)   [phone]    [facility] -[phone] (Adult)  [phone] (Child)     [facility] -[phone] (Adult and Child)     [facility] -[phone] ([employer] mobile crisis team; Adult and Child; 24hr)     [facility] -[phone] (Adult and Child)     CRISIS HOUSING  [address]               "[phone]  [facility]                                [address]                       [phone]  [facility]                               [address]     [phone]   [facility]  [address]                [phone]    [facility] EMERGENCY NUMBERS      Crisis Connection:                                [phone]    [facility] [place] [facility]:     [phone]    Crisis Intervention:                                [phone] or [phone]     COPE: Mobile Team 24hrs/7days:    [phone]  (Adults > 19yo)                                                                            [phone]  (Child   < 16yo)  [facility]        [phone] 24/7 line and Mobile Team   [address] [place] [address]  DROP IN:  M-F: 8:00 am - 7:00 pm  Sat: 11:00 am - 3:00 pm   Sun: Closed     WALK-IN COUNSELING:  [facility]       [phone]    [address]:   M, W, F:  1:00-3:00PM    M-Th:  6:30-8:30PM    TRANS and LGBT  Call [employer] at [phone]. This service is staffed by trans people 24/7.   LGBT youth <24 contemplating suicide, call the [employer] [phone].    POISON CONTROL CENTER  [phone]    OR  go to nearest ER    CHILD  \"[facility] has a needs assessment team who will do an intake evaluation. Based on the results of the intake they will recommended inpatient admission, partial hospitalization, intensive outpatient or outpatient care. The number is [phone]. \"    CRISIS TEXT LINE  Http://www.crisistextline.org  FREE SUPPORT AT YOUR FINGERTIPS, 24/7  Crisis Text Line serves anyone, in any type of crisis, providing access to free, 24/7 support and information via the medium people already use and trust: text. Here s how it works:  1)  Text 365577 from anywhere in the [place], " "anytime, about any type of crisis.  2)  A live, trained Crisis Counselor receives the text and responds quickly.      The volunteer Crisis Counselor will help you move from a 'hot moment to a cool moment'    [facility] EMERGENCY NUMBERS      Crisis Connection:                                [phone]    [facility]:              [phone]    Crisis Intervention:                                [phone] or [phone]     COPE: Mobile Team 24hrs/7days:    [phone]  (Adults > 17yo)                                                                            [phone]  (Child   < 16yo)  [facility]        [phone]  CALL 24 hours a day.  [address] [place] [address]  DROP IN:  M-F: 8:00 am - 7:00 pm  Sat: 11:00 am - 3:00 pm   Sun: Closed    WALK-IN COUNSELIN)  [facility]                                  [phone]        [place], [address] [place] [address] Ave:                  M, W:      5:00-7:00PM       2)  [facility]                            [phone]        [place], [address] [facility]                T, Th:      6:00-8:00PM    TRANS and LGBT  Call "[employer] at [phone]. This service is staffed by trans people .   LGBT youth <24 contemplating suicide, call the [facility] [employer] [phone].    POISON CONTROL CENTER  [phone]    OR  go to nearest ER    CHILD  \"[facility] has a needs assessment team who will do an intake evaluation. Based on the results of the intake they will recommended inpatient admission, partial hospitalization, intensive outpatient or outpatient care. The number is [phone] or [phone]. \"    CRISIS TEXT LINE  Http://www.crisistextline.org  FREE SUPPORT AT YOUR FINGERTIPS,   Crisis Text Line serves anyone, in any type of crisis, providing access to free,  support and information via the medium people already use and trust: text. Here s how it works:  1)  Text 170-272 from " anywhere in the [place], anytime, about any type of crisis.  2)  A live, trained Crisis Counselor receives the text and responds quickly.      The volunteer Crisis Counselor will help you move from a 'hot moment to a cool moment'      * A Community Paramedic (CP) is an advanced paramedic that works to increase access to primary and preventive care and decrease use of emergency departments, which in turn decreases health care costs. Among other things, CPs may play a key role in providing follow-up services after a hospital discharge to prevent hospital readmission. CPs can provide health assessments, chronic disease monitoring and education, medication management, immunizations and vaccinations, laboratory specimen collection, hospital discharge follow-up care and minor medical procedures. CPs work under the direction of an Ambulance Medical Director.

## 2019-10-30 ENCOUNTER — TRANSFERRED RECORDS (OUTPATIENT)
Dept: HEALTH INFORMATION MANAGEMENT | Facility: CLINIC | Age: 24
End: 2019-10-30

## 2019-11-13 ENCOUNTER — MEDICAL CORRESPONDENCE (OUTPATIENT)
Dept: HEALTH INFORMATION MANAGEMENT | Facility: CLINIC | Age: 24
End: 2019-11-13

## 2019-11-19 DIAGNOSIS — F42.9 OBSESSIVE COMPULSIVE DISORDER: Primary | ICD-10-CM

## 2019-11-19 LAB
ERYTHROCYTE [DISTWIDTH] IN BLOOD BY AUTOMATED COUNT: 12.6 % (ref 10–15)
HCT VFR BLD AUTO: 43.5 % (ref 40–53)
HGB BLD-MCNC: 14.7 G/DL (ref 13.3–17.7)
MCH RBC QN AUTO: 30.4 PG (ref 26.5–33)
MCHC RBC AUTO-ENTMCNC: 33.8 G/DL (ref 31.5–36.5)
MCV RBC AUTO: 90 FL (ref 78–100)
PLATELET # BLD AUTO: 231 10E9/L (ref 150–450)
RBC # BLD AUTO: 4.84 10E12/L (ref 4.4–5.9)
WBC # BLD AUTO: 6.9 10E9/L (ref 4–11)

## 2019-11-19 PROCEDURE — 36415 COLL VENOUS BLD VENIPUNCTURE: CPT

## 2019-11-19 PROCEDURE — 80053 COMPREHEN METABOLIC PANEL: CPT

## 2019-11-19 PROCEDURE — 84439 ASSAY OF FREE THYROXINE: CPT

## 2019-11-19 PROCEDURE — 85027 COMPLETE CBC AUTOMATED: CPT

## 2019-11-19 PROCEDURE — 82306 VITAMIN D 25 HYDROXY: CPT

## 2019-11-19 PROCEDURE — 84443 ASSAY THYROID STIM HORMONE: CPT

## 2019-11-20 ENCOUNTER — OFFICE VISIT (OUTPATIENT)
Dept: FAMILY MEDICINE | Facility: CLINIC | Age: 24
End: 2019-11-20
Payer: COMMERCIAL

## 2019-11-20 ENCOUNTER — TELEPHONE (OUTPATIENT)
Dept: PSYCHIATRY | Facility: CLINIC | Age: 24
End: 2019-11-20

## 2019-11-20 VITALS
TEMPERATURE: 98.1 F | DIASTOLIC BLOOD PRESSURE: 72 MMHG | HEART RATE: 71 BPM | BODY MASS INDEX: 23.61 KG/M2 | RESPIRATION RATE: 16 BRPM | OXYGEN SATURATION: 96 % | HEIGHT: 74 IN | WEIGHT: 184 LBS | SYSTOLIC BLOOD PRESSURE: 113 MMHG

## 2019-11-20 DIAGNOSIS — F42.9 OBSESSIVE-COMPULSIVE DISORDER, UNSPECIFIED TYPE: ICD-10-CM

## 2019-11-20 DIAGNOSIS — Z23 NEED FOR DIPHTHERIA-TETANUS-PERTUSSIS (TDAP) VACCINE: ICD-10-CM

## 2019-11-20 DIAGNOSIS — F41.9 ANXIETY: ICD-10-CM

## 2019-11-20 DIAGNOSIS — Z23 NEED FOR PROPHYLACTIC VACCINATION AND INOCULATION AGAINST INFLUENZA: Primary | ICD-10-CM

## 2019-11-20 LAB
ALBUMIN SERPL-MCNC: 4.4 G/DL (ref 3.4–5)
ALP SERPL-CCNC: 63 U/L (ref 40–150)
ALT SERPL W P-5'-P-CCNC: 54 U/L (ref 0–70)
ANION GAP SERPL CALCULATED.3IONS-SCNC: 7 MMOL/L (ref 3–14)
AST SERPL W P-5'-P-CCNC: 12 U/L (ref 0–45)
BILIRUB SERPL-MCNC: 1.1 MG/DL (ref 0.2–1.3)
BUN SERPL-MCNC: 18 MG/DL (ref 7–30)
CALCIUM SERPL-MCNC: 9.8 MG/DL (ref 8.5–10.1)
CHLORIDE SERPL-SCNC: 103 MMOL/L (ref 94–109)
CO2 SERPL-SCNC: 27 MMOL/L (ref 20–32)
CREAT SERPL-MCNC: 0.85 MG/DL (ref 0.66–1.25)
GFR SERPL CREATININE-BSD FRML MDRD: >90 ML/MIN/{1.73_M2}
GLUCOSE SERPL-MCNC: 73 MG/DL (ref 70–99)
POTASSIUM SERPL-SCNC: 4.4 MMOL/L (ref 3.4–5.3)
PROT SERPL-MCNC: 7.8 G/DL (ref 6.8–8.8)
SODIUM SERPL-SCNC: 137 MMOL/L (ref 133–144)
T4 FREE SERPL-MCNC: 0.94 NG/DL (ref 0.76–1.46)
TSH SERPL DL<=0.005 MIU/L-ACNC: 1.27 MU/L (ref 0.4–4)

## 2019-11-20 PROCEDURE — 90715 TDAP VACCINE 7 YRS/> IM: CPT | Performed by: FAMILY MEDICINE

## 2019-11-20 PROCEDURE — 99202 OFFICE O/P NEW SF 15 MIN: CPT | Mod: 25 | Performed by: FAMILY MEDICINE

## 2019-11-20 PROCEDURE — 90471 IMMUNIZATION ADMIN: CPT | Performed by: FAMILY MEDICINE

## 2019-11-20 PROCEDURE — 93000 ELECTROCARDIOGRAM COMPLETE: CPT | Performed by: FAMILY MEDICINE

## 2019-11-20 PROCEDURE — 90472 IMMUNIZATION ADMIN EACH ADD: CPT | Performed by: FAMILY MEDICINE

## 2019-11-20 PROCEDURE — 90686 IIV4 VACC NO PRSV 0.5 ML IM: CPT | Performed by: FAMILY MEDICINE

## 2019-11-20 RX ORDER — FLUOXETINE 10 MG/1
CAPSULE ORAL
Refills: 0 | COMMUNITY
Start: 2019-11-13 | End: 2020-05-28

## 2019-11-20 ASSESSMENT — PATIENT HEALTH QUESTIONNAIRE - PHQ9: SUM OF ALL RESPONSES TO PHQ QUESTIONS 1-9: 14

## 2019-11-20 ASSESSMENT — MIFFLIN-ST. JEOR: SCORE: 1889.37

## 2019-11-20 NOTE — NURSING NOTE
"Chief Complaint   Patient presents with     Consult     EKG for meds     initial /72 (BP Location: Right arm, Patient Position: Right side, Cuff Size: Adult Regular)   Pulse 71   Temp 98.1  F (36.7  C) (Oral)   Resp 16   Ht 1.88 m (6' 2\")   Wt 83.5 kg (184 lb)   SpO2 96%   BMI 23.62 kg/m   Estimated body mass index is 23.62 kg/m  as calculated from the following:    Height as of this encounter: 1.88 m (6' 2\").    Weight as of this encounter: 83.5 kg (184 lb).  BP completed using cuff size: large.  L  R arm      Health Maintenance that is potentially due pending provider review:  NONE Will update tdap and flu today  Mary Addison, Medical Assistant Apprentice    "

## 2019-11-20 NOTE — TELEPHONE ENCOUNTER
-Writer received call back from Jevon.  Informed him that clinic received a no coverage determination and that they will not cover TMS for the treatment of OCD.      -Jevon has questions about an experimental trial for DBS that Dr. Hughes mentioned.  Writer informed him that  is not involved in that but can send a message to Dr. Hughes regarding it.

## 2019-11-20 NOTE — TELEPHONE ENCOUNTER
-Writer returned call to patient.  Received voicemail.  Left message asking for a return call.  Clinic number provided.

## 2019-11-20 NOTE — TELEPHONE ENCOUNTER
-Writer called patient back.  Received voicemail asking for a return call.  Clinic number provided.

## 2019-11-20 NOTE — TELEPHONE ENCOUNTER
Bryan Hughes MD Swanson, Melanie A RN   Caller: Unspecified (Today, 11:21 AM)             Trial not open yet, but Jevon might be reasonable for it, though I think he may be missing 1-2 steps in the eligibility. Correct answer is for him to come see me to discuss next steps.

## 2019-11-20 NOTE — TELEPHONE ENCOUNTER
----- Message from Ivonne Padilla CMA sent at 11/18/2019 12:18 PM CST -----  Regarding: tms status  Patient wants an update on this TMS.     Call: [phone]

## 2019-11-20 NOTE — PROGRESS NOTES
"Subjective     Jevon Murillo is a 24 year old male who presents to clinic today for the following health issues:    HPI   EKG for medication  The patient is seeing a psychiatrist for medical management of OCD and anxiety. Her psychiatrist desires to adjust his medications but would like him to get an EKG to evaluate the length of his QT interval.     There is no problem list on file for this patient.    No past surgical history on file.    Social History     Tobacco Use     Smoking status: Never Smoker     Smokeless tobacco: Never Used   Substance Use Topics     Alcohol use: Not on file     No family history on file.        Reviewed and updated as needed this visit by Provider         Review of Systems   ROS COMP: Constitutional, HEENT, cardiovascular, pulmonary, gi and gu systems are negative, except as otherwise noted.      Objective    /72 (BP Location: Right arm, Patient Position: Right side, Cuff Size: Adult Regular)   Pulse 71   Temp 98.1  F (36.7  C) (Oral)   Resp 16   Ht 1.88 m (6' 2\")   Wt 83.5 kg (184 lb)   SpO2 96%   BMI 23.62 kg/m    Body mass index is 23.62 kg/m .  Physical Exam   GENERAL: healthy, alert and no distress  RESP: lungs clear to auscultation - no rales, rhonchi or wheezes  CV: regular rate and rhythm, normal S1 S2, no S3 or S4, no murmur, click or rub, no peripheral edema and peripheral pulses strong  MS: no gross musculoskeletal defects noted, no edema    Diagnostic Test Results:  Labs reviewed in Epic        Assessment & Plan       ICD-10-CM    1. Need for prophylactic vaccination and inoculation against influenza Z23 HC FLU VAC PRESRV FREE QUAD SPLIT VIR > 6 MONTHS IM     EA ADD'L VACCINE   2. Need for diphtheria-tetanus-pertussis (Tdap) vaccine Z23 TDAP VACCINE     ADMIN 1st VACCINE   3. Anxiety F41.9 EKG 12-lead complete w/read - Clinics      EKG reviewed. Normal sinus rhythm.    continue to see psychiatry for management of anxiety and OCD    Return in about 1 year (around " 11/20/2020) for Routine Visit with PCP - If needed.    Giancarlo Ureña MD  [facility]

## 2019-11-21 LAB — DEPRECATED CALCIDIOL+CALCIFEROL SERPL-MC: 29 UG/L (ref 20–75)

## 2019-11-27 ENCOUNTER — TELEPHONE (OUTPATIENT)
Dept: FAMILY MEDICINE | Facility: CLINIC | Age: 24
End: 2019-11-27

## 2019-11-27 NOTE — TELEPHONE ENCOUNTER
Amy Iglesias called Ria again in lab  Still no fax from us  Gave verbal that it was faxed  Had Bonita SUH RN

## 2019-11-27 NOTE — TELEPHONE ENCOUNTER
Provider Dr. Blake LEE Nurse Practitioner called wanting EKG that was done in clinic on 11/20/19 when patient saw Dr. Ureña to be signed stating it's normal and faxed to them [phone].  Providers phone number [phone].    Please advise placed EKG in providers desk.

## 2019-12-20 ENCOUNTER — OFFICE VISIT (OUTPATIENT)
Dept: PSYCHIATRY | Facility: CLINIC | Age: 24
End: 2019-12-20
Payer: COMMERCIAL

## 2019-12-20 VITALS
DIASTOLIC BLOOD PRESSURE: 83 MMHG | WEIGHT: 197 LBS | SYSTOLIC BLOOD PRESSURE: 136 MMHG | HEIGHT: 74 IN | BODY MASS INDEX: 25.28 KG/M2 | HEART RATE: 81 BPM

## 2019-12-20 DIAGNOSIS — F42.2 MIXED OBSESSIONAL THOUGHTS AND ACTS: Primary | ICD-10-CM

## 2019-12-20 RX ORDER — QUETIAPINE FUMARATE 25 MG/1
TABLET, FILM COATED ORAL
COMMUNITY
Start: 2019-12-18 | End: 2020-05-28

## 2019-12-20 RX ORDER — FLUOXETINE 40 MG/1
CAPSULE ORAL
COMMUNITY
Start: 2019-12-18 | End: 2020-05-28

## 2019-12-20 ASSESSMENT — PAIN SCALES - GENERAL: PAINLEVEL: NO PAIN (0)

## 2019-12-20 ASSESSMENT — MIFFLIN-ST. JEOR: SCORE: 1948.34

## 2019-12-20 ASSESSMENT — PATIENT HEALTH QUESTIONNAIRE - PHQ9: SUM OF ALL RESPONSES TO PHQ QUESTIONS 1-9: 20

## 2019-12-20 NOTE — LETTER
"12/20/2019       RE: Jevon Murillo  [address] [place] [address]     Dear Colleague,    Thank you for referring your patient, Jevon Murillo, to the [facility] PSYCHIATRY at [facility]. Please see a copy of my visit note below.           [facility]  Psychiatry Clinic  PSYCHIATRIC PROGRESS NOTE       Jevon Murillo is a 24 year old male who prefers the name Jevon and pronoun he, him, his.  Psychiatrist: MAXIME Masters  Therapist: Kayden Tellez (though currently with [facility])  PCP: No Ref-Primary, Physician      Pertinent Background:  Diagnoses include depression, anxiety, and OCD.  Psych critical item history includes [no critical items].     Interim History     [4, 4]     His OC symptoms are documented in my note of 10/25. They focus on [id number]-mn-[id number] obsessions and reassurance/mental checking compulsions.    Since last visit, OC symptoms are \"really bad.\" Has been at [facility] x 6-7 weeks without improvement, so they are sending him to the residential unit in [place] (starting in 1-2 wks). OC symptoms continue to consist of rehearsals and worries about hurting others. Contamination fears have evolved into fear of contaminating others and doing something sexual to them.     He has continued in ERP work as above. Feels like he is only able to do lower level exposures on the hierarchy. Some of the more intense exposure led to the development of further compulsions.     Medication-wise, he increased Prozac to 80mg (x 2 wks) and also started Seroquel 25mg. Does feel that it has helped with depression, but not with OCD.     He wanted to discuss neurosurgery, in the context of insurance denying TMS coverage. We reviewed the clinical criteria for neurosurgical referral, including our [facility]-based DBS trial. Also discussed surgical procedure.     YBOCS >= 28: met  Adequate exposure trial: met  Medically stable, has care network in " place: met  At least 3 adequate trials of a serotonergic medication: not met in my opinion.     Escitalopram has been adequate, 6 months at max dose.     Fluoxetine has not been tried at higher doses (e.g., 100mg if tolerated) for 3 months.     Paroxetine trial was only 4 months and dose unknown.  At least 1 trial of clomipramine: not ever used.  At least 1 trial of SRI augmented with anti-DA: not met.    Recent Symptoms:   Depression:  chronic SI, depressed mood, anhedonia, low energy, appetite changes, feeling worthless and psychomotor changes [slowing]  Anxiety:  obsessions/compulsions (see above)     Recent Substance Use:  None discussed today        Social/ Family History      [1ea,1ea]            [per patient report]               LIVING SITUATION- Patient lives with friends.  CHILDREN- no   SOCIAL SUPPORT / RELATIONSHIPS- parents and friends are supports. He started working with a psychologist about 2 months ago.   CULTURAL / SPIRITUAL- none provided  EDUCATION- Patient has a Bachelor's Degree  EMPLOYMENT- Patient works as a  about 20 hours per week at his brother's company. Before this, was living in [place] [facility].      FINANCIAL SUPPORT- Earned income     STRENGTHS / COPING- Patient notes that he uses distraction (I.e. Watching youtube) to help him cope with OCD and the subsequent anxiety and depression. When he is feeling better he tries to engage with his friends and roommates.     Patient is actively working toward recovery by working with his therapist and psychiatric provider.     Trauma History (self-report)- no     Legal- no     FAMILY MENTAL HEALTH HX- yes, his father has a history of Bipolar/Schizoaffective Disorders and ADD; mother has depression, anxiety, and PTSD; oldest brother has Bipolar Disorder; the second oldest brother has OCD and depression; sister has depression, anxiety and ADD.       Medical / Surgical History                               There is no problem list on file  "for this patient.      No past surgical history on file.     Medical Review of Systems         [2,10]   The remainder of the review of systems is noncontributory  Allergy    No known allergies  Current Medications        Current Outpatient Medications   Medication Sig Dispense Refill     FLUoxetine (PROZAC) 10 MG capsule TK 1 C PO  QD  0     FLUoxetine (PROZAC) 20 MG capsule TK 1 C PO QAM ALONG WITH A 40 MG C  2     FLUOXETINE HCL PO 60 mg daily        INOSITOL PO        MELATONIN PO Take by mouth nightly as needed       Omega-3 Fatty Acids (FISH OIL PO)        Vitals         [3, 3]   /83   Pulse 81   Ht 1.88 m (6' 2\")   Wt 89.4 kg (197 lb)   BMI 25.29 kg/m      Mental Status Exam        [9, 14 cog gs]     Alertness: alert  and oriented  Appearance: adequately groomed  Behavior/Demeanor: cooperative, with good  eye contact   Speech: regular rate and rhythm  Language: intact  Psychomotor: normal or unremarkable  Mood: depressed and anxious  Affect: restricted; was congruent to mood; was congruent to content  Thought Process/Associations: unremarkable  Thought Content:  Reports chronic SI;  Denies none  Perception:  Reports none;  Denies auditory hallucinations and visual hallucinations  Insight: good  Judgment: good  Cognition: (6) does  appear grossly intact; formal cognitive testing was not done  Gait/Station and/or Muscle Strength/Tone: unremarkable    Labs and Data                          Rating Scales:      PHQ9 Today:  20  PHQ-9 SCORE 9/25/2019 10/25/2019 11/20/2019   PHQ-9 Total Score 14 18 14         Diagnosis      OCD     Assessment      [m2, h3]     TODAY:   We discussed that criteria for DBS consists of 3 items, which 1) severe OCD, 2) adequate dose of ERP, and 3) adequate medication trials. Discussed that he certainly meets the first two criteria, but he has not yet had adequate trials. Recommended increasing Prozac to 100mg and further titrating Seroquel. If the higher dose of Prozac is not " helping, would then switch to clomipramine. Discussed that these trials likely will take some time, which may not be a bad thing as the DBS clinical trial is on hold due to a device change anyway. We reviewed the actual procedure for DBS placement, potential risks (bleeding, infection, impulsivity, karla, small but non-zero risk for permanent damage or death), and likelihood of response.    Until we have devices, the plan is basically to continue optimizing good psychiatric management and knocking off trial criteria. I do think, ultimately, he is a good DBS candidate and there is a chance of benefit.I will follow progress closely over the next 6 months, our approximate time window to begin offering DBS.    I still think he would benefit from TMS; we will appeal that non-coverage. Also, if he is going to [facility] inpatient, they may offer.      Plan                                                                                                                     m2, h3     1) MEDICATION:  - Increase Prozac to 100mg QDAY (if not working, would switch to clomipramine)  - Further titrate Seroquel. I would want to see at least 400mg. If there is time, trial of a more typical agent such as haloperidol would be desirable.    2) THERAPY: continue ERP work. Inpatient/intensive is definitely a good idea here.    3) OTHER COMPONENTS:  None today, but see above re DBS.      RTC: ~2-3 months    CRISIS NUMBERS:   Provided routinely in AVS.    Treatment Risk Statement:  The patient understands the risks, benefits, adverse effects and alternatives. Agrees to treatment with the capacity to do so. No medical contraindications to treatment. Agrees to call clinic for any problems. The patient understands to call 911 or go to the nearest ED if life threatening or urgent symptoms occur.     Time based billing; 30min spent face to face with the patient with greater than 50% of time spent on  coordination of care, counseling and discussion  of treatment alternatives.    Bryan Hughes MD      Again, thank you for allowing me to participate in the care of your patient.      Sincerely,    Bryan Hughes MD

## 2019-12-20 NOTE — PATIENT INSTRUCTIONS
The main barrier to DBS is that you have not had adequate medication trials. We first need to optimize medications and try some alternatives  - work with your psychiatrist to continue increasing Prozac (100mg) and Seroquel (probably should be a few hundred mg or just below threshold of side effects)  - if Prozac is ineffective, should try switching to clomipramine (at least 150mg). If tolerable, maintain Seroquel if switched to clomipramine  - also consider retrial of Vyvanse    If OCD is not improved with above, we definitely can pursue DBS  If TMS does get approved, this would be worth trying. Also ask [facility] in [place] whether they can do TMS while you're there

## 2019-12-20 NOTE — PROGRESS NOTES
"       [facility]  Psychiatry Clinic  PSYCHIATRIC PROGRESS NOTE       Jevon Murillo is a 24 year old male who prefers the name Jevon and pronoun he, him, his.  Psychiatrist: MAXIME Masters  Therapist: Kayden Tellez (though currently with [facility])  PCP: No Ref-Primary, Physician      Pertinent Background:  Diagnoses include depression, anxiety, and OCD.  Psych critical item history includes [no critical items].     Interim History     [4, 4]     His OC symptoms are documented in my note of 10/25. They focus on [id number] obsessions and reassurance/mental checking compulsions.    Since last visit, OC symptoms are \"really bad.\" Has been at [facility] x 6-7 weeks without improvement, so they are sending him to the residential unit in [place] (starting in 1-2 wks). OC symptoms continue to consist of rehearsals and worries about hurting others. Contamination fears have evolved into fear of contaminating others and doing something sexual to them.     He has continued in ERP work as above. Feels like he is only able to do lower level exposures on the hierarchy. Some of the more intense exposure led to the development of further compulsions.     Medication-wise, he increased Prozac to 80mg (x 2 wks) and also started Seroquel 25mg. Does feel that it has helped with depression, but not with OCD.     He wanted to discuss neurosurgery, in the context of insurance denying TMS coverage. We reviewed the clinical criteria for neurosurgical referral, including our [facility]-based DBS trial. Also discussed surgical procedure.     YBOCS >= 28: met  Adequate exposure trial: met  Medically stable, has care network in place: met  At least 3 adequate trials of a serotonergic medication: not met in my opinion.     Escitalopram has been adequate, 6 months at max dose.     Fluoxetine has not been tried at higher doses (e.g., 100mg if tolerated) for 3 months.     Paroxetine trial was only 4 months and " dose unknown.  At least 1 trial of clomipramine: not ever used.  At least 1 trial of SRI augmented with anti-DA: not met.    Recent Symptoms:   Depression:  chronic SI, depressed mood, anhedonia, low energy, appetite changes, feeling worthless and psychomotor changes [slowing]  Anxiety:  obsessions/compulsions (see above)     Recent Substance Use:  None discussed today        Social/ Family History      [1ea,1ea]            [per patient report]               LIVING SITUATION- Patient lives with friends.  CHILDREN- no   SOCIAL SUPPORT / RELATIONSHIPS- parents and friends are supports. He started working with a psychologist about 2 months ago.   CULTURAL / SPIRITUAL- none provided  EDUCATION- Patient has a Bachelor's Degree  EMPLOYMENT- Patient works as a  about 20 hours per week at his brother's company. Before this, was living in [facility].      FINANCIAL SUPPORT- Earned income     STRENGTHS / COPING- Patient notes that he uses distraction (I.e. Watching youtube) to help him cope with OCD and the subsequent anxiety and depression. When he is feeling better he tries to engage with his friends and roommates.     Patient is actively working toward recovery by working with his therapist and psychiatric provider.     Trauma History (self-report)- no     Legal- no     FAMILY MENTAL HEALTH HX- yes, his father has a history of Bipolar/Schizoaffective Disorders and ADD; mother has depression, anxiety, and PTSD; oldest brother has Bipolar Disorder; the second oldest brother has OCD and depression; sister has depression, anxiety and ADD.       Medical / Surgical History                               There is no problem list on file for this patient.      No past surgical history on file.     Medical Review of Systems         [2,10]   The remainder of the review of systems is noncontributory  Allergy    No known allergies  Current Medications        Current Outpatient Medications   Medication Sig Dispense Refill  "    FLUoxetine (PROZAC) 10 MG capsule TK 1 C PO  QD  0     FLUoxetine (PROZAC) 20 MG capsule TK 1 C PO QAM ALONG WITH A 40 MG C  2     FLUOXETINE HCL PO 60 mg daily        INOSITOL PO        MELATONIN PO Take by mouth nightly as needed       Omega-3 Fatty Acids (FISH OIL PO)        Vitals         [3, 3]   /83   Pulse 81   Ht 1.88 m (6' 2\")   Wt 89.4 kg (197 lb)   BMI 25.29 kg/m     Mental Status Exam        [9, 14 cog gs]     Alertness: alert  and oriented  Appearance: adequately groomed  Behavior/Demeanor: cooperative, with good  eye contact   Speech: regular rate and rhythm  Language: intact  Psychomotor: normal or unremarkable  Mood: depressed and anxious  Affect: restricted; was congruent to mood; was congruent to content  Thought Process/Associations: unremarkable  Thought Content:  Reports chronic SI;  Denies none  Perception:  Reports none;  Denies auditory hallucinations and visual hallucinations  Insight: good  Judgment: good  Cognition: (6) does  appear grossly intact; formal cognitive testing was not done  Gait/Station and/or Muscle Strength/Tone: unremarkable    Labs and Data                          Rating Scales:      PHQ9 Today:  20  PHQ-9 SCORE 9/25/2019 10/25/2019 11/20/2019   PHQ-9 Total Score 14 18 14         Diagnosis      OCD     Assessment      [m2, h3]     TODAY:   We discussed that criteria for DBS consists of 3 items, which 1) severe OCD, 2) adequate dose of ERP, and 3) adequate medication trials. Discussed that he certainly meets the first two criteria, but he has not yet had adequate trials. Recommended increasing Prozac to 100mg and further titrating Seroquel. If the higher dose of Prozac is not helping, would then switch to clomipramine. Discussed that these trials likely will take some time, which may not be a bad thing as the DBS clinical trial is on hold due to a device change anyway. We reviewed the actual procedure for DBS placement, potential risks (bleeding, infection, " impulsivity, karla, small but non-zero risk for permanent damage or death), and likelihood of response.    Until we have devices, the plan is basically to continue optimizing good psychiatric management and knocking off trial criteria. I do think, ultimately, he is a good DBS candidate and there is a chance of benefit.I will follow progress closely over the next 6 months, our approximate time window to begin offering DBS.    I still think he would benefit from TMS; we will appeal that non-coverage. Also, if he is going to [facility], they may offer.      Plan                                                                                                                     m2, h3     1) MEDICATION:  - Increase Prozac to 100mg QDAY (if not working, would switch to clomipramine)  - Further titrate Seroquel. I would want to see at least 400mg. If there is time, trial of a more typical agent such as haloperidol would be desirable.    2) THERAPY: continue ERP work. Inpatient/intensive is definitely a good idea here.    3) OTHER COMPONENTS:  None today, but see above re DBS.      RTC: ~2-3 months    CRISIS NUMBERS:   Provided routinely in AVS.    Treatment Risk Statement:  The patient understands the risks, benefits, adverse effects and alternatives. Agrees to treatment with the capacity to do so. No medical contraindications to treatment. Agrees to call clinic for any problems. The patient understands to call 911 or go to the nearest ED if life threatening or urgent symptoms occur.     Time based billing; 30min spent face to face with the patient with greater than 50% of time spent on  coordination of care, counseling and discussion of treatment alternatives.    Bryan Hughes MD

## 2020-03-11 ENCOUNTER — HEALTH MAINTENANCE LETTER (OUTPATIENT)
Age: 25
End: 2020-03-11

## 2020-05-19 ENCOUNTER — TRANSFERRED RECORDS (OUTPATIENT)
Dept: HEALTH INFORMATION MANAGEMENT | Facility: CLINIC | Age: 25
End: 2020-05-19

## 2020-05-19 LAB
ALT SERPL-CCNC: 138 U/L (ref 9–46)
AST SERPL-CCNC: 59 U/L (ref 10–40)
CHOLEST SERPL-MCNC: 246 MG/DL
CREAT SERPL-MCNC: 1.02 MG/DL (ref 0.6–1.35)
GFR SERPL CREATININE-BSD FRML MDRD: 102 ML/MIN/1.73M2
GLUCOSE SERPL-MCNC: 90 MG/DL (ref 65–139)
HBA1C MFR BLD: 5.6 % (ref 0–5.7)
HDLC SERPL-MCNC: 64 MG/DL
LDLC SERPL CALC-MCNC: 145 MG/DL
NONHDLC SERPL-MCNC: 182 MG/DL
POTASSIUM SERPL-SCNC: 4.5 MMOL/L (ref 3.5–5.3)
TRIGL SERPL-MCNC: 230 MG/DL

## 2020-05-28 ENCOUNTER — OFFICE VISIT (OUTPATIENT)
Dept: FAMILY MEDICINE | Facility: CLINIC | Age: 25
End: 2020-05-28
Payer: COMMERCIAL

## 2020-05-28 VITALS
DIASTOLIC BLOOD PRESSURE: 66 MMHG | HEIGHT: 73 IN | BODY MASS INDEX: 34.72 KG/M2 | RESPIRATION RATE: 16 BRPM | HEART RATE: 92 BPM | SYSTOLIC BLOOD PRESSURE: 129 MMHG | TEMPERATURE: 98.3 F | WEIGHT: 262 LBS | OXYGEN SATURATION: 98 %

## 2020-05-28 DIAGNOSIS — E78.2 MIXED HYPERLIPIDEMIA: ICD-10-CM

## 2020-05-28 DIAGNOSIS — R73.09 HIGH GLUCOSE: Primary | ICD-10-CM

## 2020-05-28 DIAGNOSIS — F39 MOOD DISORDER (H): ICD-10-CM

## 2020-05-28 DIAGNOSIS — E66.01 MORBID OBESITY (H): ICD-10-CM

## 2020-05-28 DIAGNOSIS — R63.5 WEIGHT GAIN: ICD-10-CM

## 2020-05-28 DIAGNOSIS — F42.8 OTHER OBSESSIVE-COMPULSIVE DISORDERS: ICD-10-CM

## 2020-05-28 DIAGNOSIS — R74.8 ELEVATED LIVER ENZYMES: ICD-10-CM

## 2020-05-28 LAB
ALBUMIN SERPL-MCNC: 4.2 G/DL (ref 3.4–5)
ALP SERPL-CCNC: 57 U/L (ref 40–150)
ALT SERPL W P-5'-P-CCNC: 156 U/L (ref 0–70)
ANION GAP SERPL CALCULATED.3IONS-SCNC: 8 MMOL/L (ref 3–14)
AST SERPL W P-5'-P-CCNC: 68 U/L (ref 0–45)
BILIRUB SERPL-MCNC: 0.6 MG/DL (ref 0.2–1.3)
BUN SERPL-MCNC: 15 MG/DL (ref 7–30)
CALCIUM SERPL-MCNC: 9.3 MG/DL (ref 8.5–10.1)
CHLORIDE SERPL-SCNC: 101 MMOL/L (ref 94–109)
CO2 SERPL-SCNC: 27 MMOL/L (ref 20–32)
CREAT SERPL-MCNC: 0.88 MG/DL (ref 0.66–1.25)
GFR SERPL CREATININE-BSD FRML MDRD: >90 ML/MIN/{1.73_M2}
GGT SERPL-CCNC: 60 U/L (ref 0–75)
GLUCOSE SERPL-MCNC: 94 MG/DL (ref 70–99)
HBA1C MFR BLD: 5.6 % (ref 0–5.6)
POTASSIUM SERPL-SCNC: 4.3 MMOL/L (ref 3.4–5.3)
PROT SERPL-MCNC: 8.1 G/DL (ref 6.8–8.8)
SODIUM SERPL-SCNC: 136 MMOL/L (ref 133–144)
T3FREE SERPL-MCNC: 2.9 PG/ML (ref 2.3–4.2)
TSH SERPL DL<=0.005 MIU/L-ACNC: 1.68 MU/L (ref 0.4–4)

## 2020-05-28 PROCEDURE — 82977 ASSAY OF GGT: CPT | Performed by: FAMILY MEDICINE

## 2020-05-28 PROCEDURE — 84481 FREE ASSAY (FT-3): CPT | Performed by: FAMILY MEDICINE

## 2020-05-28 PROCEDURE — 86376 MICROSOMAL ANTIBODY EACH: CPT | Performed by: FAMILY MEDICINE

## 2020-05-28 PROCEDURE — 83036 HEMOGLOBIN GLYCOSYLATED A1C: CPT | Performed by: FAMILY MEDICINE

## 2020-05-28 PROCEDURE — 84443 ASSAY THYROID STIM HORMONE: CPT | Performed by: FAMILY MEDICINE

## 2020-05-28 PROCEDURE — 80053 COMPREHEN METABOLIC PANEL: CPT | Performed by: FAMILY MEDICINE

## 2020-05-28 PROCEDURE — 99214 OFFICE O/P EST MOD 30 MIN: CPT | Performed by: FAMILY MEDICINE

## 2020-05-28 RX ORDER — ARIPIPRAZOLE 5 MG/1
TABLET ORAL
COMMUNITY
Start: 2020-05-13

## 2020-05-28 RX ORDER — CLOMIPRAMINE HYDROCHLORIDE 25 MG/1
CAPSULE ORAL
COMMUNITY
Start: 2020-02-20

## 2020-05-28 ASSESSMENT — PATIENT HEALTH QUESTIONNAIRE - PHQ9: SUM OF ALL RESPONSES TO PHQ QUESTIONS 1-9: 5

## 2020-05-28 ASSESSMENT — MIFFLIN-ST. JEOR: SCORE: 2219.36

## 2020-05-28 NOTE — PATIENT INSTRUCTIONS
Access Services for [employer] phone number:    [phone]      Dely Login ID: [email or web address] Change Login IDChange Password     Dely Status: Pending Activate PatientDeactivate PatientPatient Declined    Two-Step Verification Status: Enabled Enable Two-Step VerificationDisable Two-Step Verification    Last Status Change: Patient was deactivated on May 28, 2020 9:29 AM by Viji Zeng MD.     Status Change Comments:      Activation Code: [id number] Generate CodeInvalidate Code    Code Expiration: 7/27/2020 9:29 AM

## 2020-05-28 NOTE — NURSING NOTE
"Chief Complaint   Patient presents with     Weight Problem     start metformin and recheck liver enzymes     Thyroid Problem     initial /66 (BP Location: Left arm, Cuff Size: Adult Large)   Pulse 92   Temp 98.3  F (36.8  C) (Oral)   Resp 16   Ht 1.842 m (6' 0.5\")   Wt 118.8 kg (262 lb)   SpO2 98%   BMI 35.05 kg/m   Estimated body mass index is 35.05 kg/m  as calculated from the following:    Height as of this encounter: 1.842 m (6' 0.5\").    Weight as of this encounter: 118.8 kg (262 lb).  BP completed using cuff size: large.  L  arm      Health Maintenance that is potentially due pending provider review:  NONE    n/a    Kervin Land ma  "

## 2020-05-28 NOTE — PROGRESS NOTES
Subjective     Jevon Murillo is a 24 year old male who presents to clinic today for the following health issues:    HPI   Labs-weight gain-thyroid test-start metformin      Duration: per Alison Frederick    Description (location/character/radiation):     Intensity:      Accompanying signs and symptoms:     History (similar episodes/previous evaluation):     Precipitating or alleviating factors:     Therapies tried and outcome:    Here to review elevated glucose/ fasting and liver enzymes- no avaliable record sent from  [facility]-        Known history of mood disorder obsessive compulsive disorder, anxiety and depression.  He does see a licensed nurse practitioner, medication changes have been made. He noted increased appetite and weight gain from higher dose of clomipramine.    Had blood work got 2 weeks ago no records are available . He was fasting at the time of blood test, wants to does not have diabetes but the blood sugars were high and to consider metformin.        Was tested for blood sugar and tested high , not Diabetes  But high- wondering about metformin   Patient denies alcohol abuse   PHQ 11/20/2019 12/20/2019 5/28/2020   PHQ-9 Total Score 14 20 5   Q9: Thoughts of better off dead/self-harm past 2 weeks More than half the days More than half the days Not at all     BP Readings from Last 3 Encounters:   05/28/20 129/66   12/20/19 136/83   11/20/19 113/72    Wt Readings from Last 3 Encounters:   05/28/20 118.8 kg (262 lb)   12/20/19 89.4 kg (197 lb)   11/20/19 83.5 kg (184 lb)                    PROBLEMS TO ADD ON...  Reviewed and updated as needed this visit by Provider  Shaun Camp         Review of Systems   Constitutional, HEENT, cardiovascular, pulmonary, GI, , musculoskeletal, neuro, skin, endocrine and psych systems are negative, except as otherwise noted.      Objective    /66 (BP Location: Left arm, Cuff Size: Adult Large)   Pulse 92   Temp 98.3  F (36.8  C) (Oral)   Resp 16   Ht 1.842 m (6'  "0.5\")   Wt 118.8 kg (262 lb)   SpO2 98%   BMI 35.05 kg/m    Body mass index is 35.05 kg/m .  Physical Exam   GENERAL: healthy, alert and no distress  NECK: no adenopathy, no asymmetry, masses, or scars and thyroid normal to palpation  RESP: lungs clear to auscultation - no rales, rhonchi or wheezes  CV: regular rate and rhythm, normal S1 S2, no S3 or S4, no murmur, click or rub, no peripheral edema and peripheral pulses strong  ABDOMEN: soft, nontender, no hepatosplenomegaly, no masses and bowel sounds normal  MS: no gross musculoskeletal defects noted, no edema  PSYCH: mentation appears normal, affect normal/bright    Diagnostic Test Results:  Labs reviewed in Epic  Results for orders placed or performed in visit on 05/28/20 (from the past 24 hour(s))   Hemoglobin A1c   Result Value Ref Range    Hemoglobin A1C 5.6 0 - 5.6 %           Assessment & Plan   23 yo male with known history of OCD, depression, mood disorder with weight gain from most likely psych medications &  recently diagnosed high fasting glucose, & liver enzymes    1. High glucose  Fasting glucose, 2 weeks ago was reported high , no avaliable records   He does meet the  criteria for metabolic syndrome,   also reports lipids been high as well.  I think it is appropriate to start him on metformin 1 tablet once a day next week montelukast twice a day.  - metFORMIN (GLUCOPHAGE) 500 MG tablet; Take 1 tablet (500 mg) by mouth 2 times daily (with meals)  Dispense: 30 tablet; Refill: 1    2. Weight gain   Wt 83.5 kg (184 lb)- NOV 2019  Most likely from clomipramine.  Blood test obtained for thyroid  - TSH with free T4 reflex  - Hemoglobin A1c  - Comprehensive metabolic panel - Thyroid peroxidase antibody  - T3 Free    3. Mood disorder (H)  PHQ 11/20/2019 12/20/2019 5/28/2020   PHQ-9 Total Score 14 20 5   Q9: Thoughts of better off dead/self-harm past 2 weeks More than half the days More than half the days Not at all     4. Other obsessive-compulsive " "disorders    Under care of LNP- Ashley Frederick  No medications changes   5. Elevated liver enzymes  Possibly fatty liver- will review labs  - GGT  Patient denies alcohol abuse      BMI:obesity    Estimated body mass index is 35.05 kg/m  as calculated from the following:    Height as of this encounter: 1.842 m (6' 0.5\").    Weight as of this encounter: 118.8 kg (262 lb).   Weight management plan: Discussed healthy diet and exercise guidelines    Addendum:  Labs received/reviewied :5/19/2020  Total hyperlipidemia:245  HDL:64    [id number]    Work on weight loss  Regular exercise    Return in about 3 weeks (around 6/18/2020).    Viji Zeng MD  [facility]        "

## 2020-05-29 LAB — THYROPEROXIDASE AB SERPL-ACNC: <10 IU/ML

## 2020-06-01 NOTE — RESULT ENCOUNTER NOTE
Dear team, Send lab & letter & if patient calls, ok to discuss    Dear Jevon    The glucose is normal & A1C, is at upper normal test- its a screening test for Diabetes   Thyroid test, all normal.  Kidney functions normal   Liver enzymes are high. You can discuss these tests with your mental health provider.  The liver enzymes to be repeated in 1-2 months, can be a lab only appointment .    Please keep us posted with questions or concerns .      Best Regards,    Viji Zeng MD  [facility]  [phone]

## 2020-06-18 ENCOUNTER — VIRTUAL VISIT (OUTPATIENT)
Dept: FAMILY MEDICINE | Facility: CLINIC | Age: 25
End: 2020-06-18
Payer: COMMERCIAL

## 2020-06-18 DIAGNOSIS — E66.01 MORBID OBESITY (H): ICD-10-CM

## 2020-06-18 DIAGNOSIS — R74.8 ELEVATED LIVER ENZYMES: Primary | ICD-10-CM

## 2020-06-18 DIAGNOSIS — E88.810 METABOLIC SYNDROME: ICD-10-CM

## 2020-06-18 DIAGNOSIS — E78.2 MIXED HYPERLIPIDEMIA: ICD-10-CM

## 2020-06-18 DIAGNOSIS — R73.03 PREDIABETES: ICD-10-CM

## 2020-06-18 PROCEDURE — 99214 OFFICE O/P EST MOD 30 MIN: CPT | Mod: 95 | Performed by: FAMILY MEDICINE

## 2020-06-18 NOTE — PROGRESS NOTES
"Jevon Murillo is a 24 year old male who is being evaluated via a billable video visit.      The patient has been notified of following:     \"This video visit will be conducted via a call between you and your physician/provider. We have found that certain health care needs can be provided without the need for an in-person physical exam.  This service lets us provide the care you need with a video conversation.  If a prescription is necessary we can send it directly to your pharmacy.  If lab work is needed we can place an order for that and you can then stop by our lab to have the test done at a later time.    Video visits are billed at different rates depending on your insurance coverage.  Please reach out to your insurance provider with any questions.    If during the course of the call the physician/provider feels a video visit is not appropriate, you will not be charged for this service.\"    Patient has given verbal consent for Video visit? Yes    How would you like to obtain your AVS? [facility]  Patient would like the video invitation sent by: Text to cell phone: [phone]    Will anyone else be joining your video visit? No      Subjective     Jevon Murillo is a 24 year old male who presents today via video visit for the following health issues:    HPI     He is Under care of Ashley Frederick LPN  & Dr Hughes- has follow up next week       Often Hungry, feels as a result of change of medications- higher dose of clomipramine, now more Binge eating-   Previously seroquel- dec 2019 & that too increased  appetite  High dose of prozac, seroquel- all caused increased appetite  Clomipramine- also increased weight -though mood is a bit stable   Ready to try DBS- deep Brain stimulation- [facility]  Psychiatrist- Dr Hughes, has an appointment next week           Video Start Time: 10:31 AM    Labs Follow-Up      Are you regularly taking any medication or supplement to lower your cholesterol?   No    Are you having muscle " "aches or other side effects that you think could be caused by your cholesterol lowering medication?  No        BP Readings from Last 3 Encounters:   05/28/20 129/66   12/20/19 136/83   11/20/19 113/72    Wt Readings from Last 3 Encounters:   05/28/20 118.8 kg (262 lb)   12/20/19 89.4 kg (197 lb)   11/20/19 83.5 kg (184 lb)                    Reviewed and updated as needed this visit by Provider         Review of Systems   Constitutional, HEENT, cardiovascular, pulmonary, GI, , musculoskeletal, neuro, skin, endocrine and psych systems are negative, except as otherwise noted.      Objective    There were no vitals taken for this visit.  Estimated body mass index is 35.05 kg/m  as calculated from the following:    Height as of 5/28/20: 1.842 m (6' 0.5\").    Weight as of 5/28/20: 118.8 kg (262 lb).  Physical Exam     GENERAL: Healthy, alert and no distress  EYES: Eyes grossly normal to inspection.  No discharge or erythema, or obvious scleral/conjunctival abnormalities.  RESP: No audible wheeze, cough, or visible cyanosis.  No visible retractions or increased work of breathing.    SKIN: Visible skin clear. No significant rash, abnormal pigmentation or lesions.  NEURO: Cranial nerves grossly intact.  Mentation and speech appropriate for age.  PSYCH: Mentation appears normal, affect normal/bright, judgement and insight intact, normal speech and appearance well-groomed.      Diagnostic Test Results:  Labs reviewed in Epic  none         Assessment & Plan     1. Elevated liver enzymes    Also review with psych provider - all psych medications     - US Abdomen Limited; Future ? Possibly fatty liver  - repeat hepatic enzyme  in 1-3 months    Hepatic panel FUTURE 2mo; Future  - Hepatitis C antibody; Future  - Hepatitis B Surface Antibody; Future  - Hepatitis B surface antigen; Future    2. Morbid obesity (H)  Binge eating- discuss with psychiatrist     3. Metabolic syndrome    - metFORMIN (GLUCOPHAGE) 500 MG tablet; Take 1 " tablet (500 mg) by mouth 2 times daily (with meals)  Dispense: 90 tablet; Refill: 1    4. Mixed hyperlipidemia  Life style changes     5. Prediabetes  A1C 5.6  Start metformin 500 mg once daily   Follow up in 2- 3 months   encouraged life style changes  Excercise              Return in about 8 weeks (around 8/13/2020) for virtual/video visit, concerns,unresolved, lab results.    Viji Zeng MD  [facility]      Video-Visit Details    Type of service:  Video Visit    Video End Time:10:49 AM    Originating Location (pt. Location): Home    Distant Location (provider location):  [facility]     Platform used for Video Visit: AmWell    Return in about 8 weeks (around 8/13/2020) for virtual/video visit, concerns,unresolved, lab results.       Viji Zeng MD

## 2020-06-26 ENCOUNTER — VIRTUAL VISIT (OUTPATIENT)
Dept: PSYCHIATRY | Facility: CLINIC | Age: 25
End: 2020-06-26
Payer: COMMERCIAL

## 2020-06-26 DIAGNOSIS — F42.9 OBSESSIVE-COMPULSIVE DISORDER, UNSPECIFIED TYPE: Primary | ICD-10-CM

## 2020-06-26 ASSESSMENT — PATIENT HEALTH QUESTIONNAIRE - PHQ9: SUM OF ALL RESPONSES TO PHQ QUESTIONS 1-9: 10

## 2020-06-26 NOTE — PATIENT INSTRUCTIONS
We will need to meet again to talk more about the possibility of deep brain stimulation. The situation has flipped from last time -- you almost meet the medication trial criteria, and the exposure criteria, but your symptom levels are just below the amount that would qualify for the clinical trial. On the other hand, that is because of medications that you understandably do not want to stay on.    I do think it is worht trying to stick with exposure. It is hard -- but it also helps. Basically just like any other exercise.    One thing to consider with Janae Sapp -- officially, the criteria for neurosurgery are trying three SSRIs and clomipramine. You have, by my count, only had strong trials of two of them, Lexapro and Prozac. If clomipramine is not tolerable due to pain and weight gain, then switch to another medication. For instance, it would be worth trying sertraline (Zoloft) and trying to get as close as you can to 400mg.      While we are waiting to implement the recommendations you and I have discussed, you should know what to do if your symptoms worsen. A variety of crisis numbers/resources are available. They include:    CRISIS GENERAL NUMBERS   1-786-TSFAROQ (1-991.311.9036)  OR  911     CRISIS INTERVENTION TEAM (CIT) - this is a POLICE UNIT, specially trained.  This website has information for all of [place]'s CITs. Lays out which areas have this team.  Http://[email or web address]  However, one can just call 911 and ask for this special team.   If police need to be called, DO ask for this team.    CRISIS MOBILE TEAMS  [and see end of this phrase*]  [facility] -COPE: 24hrs/7days:    [phone]  (Adults > 17yo)    [phone]  (Child   < 18yo)                                       FRONT DOOR (during the day could call)   [phone]    [facility] -[phone] (Adult)  [phone] (Child)     [facility] -[phone] (Adult and Child)     [facility]  "[facility] -[phone] ([facility] mobile crisis team; Adult and Child; 24hr)     [facility] and [facility] -[phone] (Adult and Child)     CRISIS HOUSING  [facility]                           [address]              [phone]  [facility]                                [address]                       [phone]  [facility]                               [address]     [phone]   Kwame Vazquez  [facility]  [address]                [phone]    [facility] EMERGENC[facility] NUMBERS      Crisis Connection:                                [phone]    [facility]:     [phone]    Crisis Intervention:                                [phone] or [phone]     COPE: Mobile Team 24hrs/7days:    [phone]  (Adults > 19yo)                                                                            [phone]  (Child   < 18yo)  [facility]        [phone] 24/7 line and Mobile Team   [address] [place] [address]  DROP IN:  M-F: 8:00 am - 7:00 pm  Sat: 11:00 am - 3:00 pm   Sun: Closed     WALK-IN COUNSELING:  [facility]       [phone]    [address]:   M, W, F:  1:00-3:00PM    M-Th:  6:30-8:30PM    TRANS and LGBT  Call [facility] at [phone]. This service is staffed by trans people 24/7.   LGBT youth <24 contemplating suicide, call the [facility] [phone].    POISON CONTROL CENTER  [phone]    OR  go to nearest ER    CHILD  \"[facility] has a needs assessment team who will do an intake evaluation. Based on the results of the intake they will recommended inpatient admission, partial hospitalization, intensive outpatient or outpatient care. The number is [phone]. \"    CRISIS TEXT LINE  Http://www.crisistextline.org  FREE SUPPORT AT " "YOUR FINGERTIPS,   Crisis Text Line serves anyone, in any type of crisis, providing access to free,  support and information via the medium people already use and trust: text. Here s how it works:  1)  Text [phone] from anywhere in the [place], anytime, about any type of crisis.  2)  A live, trained Crisis Counselor receives the text and responds quickly.      The volunteer Crisis Counselor will help you move from a 'hot moment to a cool moment'    [facility] EMERGENCY NUMBERS      Crisis Connection:                                [phone]    [facility]:              [phone]    Crisis Intervention:                                [phone] or [phone]     COPE: Mobile Team 24hrs/7days:    [phone]  (Adults > 19yo)                                                                            [phone]  (Child   < 18yo)  [facility]        [phone]  CALL 24 hours a day.  [address] [place] [address]  DROP IN:  M-F: 8:00 am - 7:00 pm  Sat: 11:00 am - 3:00 pm   Sun: Closed    WALK-IN COUNSELIN)  [facility]                                  [phone]        [address]:                  M, W:      5:00-7:00PM       2)  [facility]                            [phone]        [address]                T, Th:      6:00-8:00PM    TRANS and LGBT  Call [facility] at [phone]. This service is staffed by trans people .   LGBT youth <24 contemplating suicide, call the [facility] [phone].    POISON CONTROL CENTER  [phone]    OR  go to nearest ER    CHILD  \"[facility] has a needs assessment team who will do an intake evaluation. Based on the results of the intake they will recommended inpatient admission, partial hospitalization, intensive outpatient or outpatient care. The number is [phone] or [phone]. \"    CRISIS TEXT " LINE  Http://www.crisistextline.org  FREE SUPPORT AT YOUR FINGERTIPS, 24/7  Crisis Text Line serves anyone, in any type of crisis, providing access to free, 24/7 support and information via the medium people already use and trust: text. Here s how it works:  1)  Text [phone] from anywhere in the [place], anytime, about any type of crisis.  2)  A live, trained Crisis Counselor receives the text and responds quickly.      The volunteer Crisis Counselor will help you move from a 'hot moment to a cool moment'      * A Community Paramedic (CP) is an advanced paramedic that works to increase access to primary and preventive care and decrease use of emergency departments, which in turn decreases health care costs. Among other things, CPs may play a key role in providing follow-up services after a hospital discharge to prevent hospital readmission. CPs can provide health assessments, chronic disease monitoring and education, medication management, immunizations and vaccinations, laboratory specimen collection, hospital discharge follow-up care and minor medical procedures. CPs work under the direction of an Ambulance Medical Director.

## 2020-06-26 NOTE — PROGRESS NOTES
"       [facility]  Psychiatry Clinic  PSYCHIATRIC PROGRESS NOTE     CARE TEAM: PCP: Viji Zeng WILFREDO Murillo is a 24 year old male who prefers the name Jevon and pronoun he, him, his.  Psychiatrist: Janae Sapp DNP/MAXIME, [facility] (has seen once)  Therapist: None at present, considering Sruthi Chery at [facility] when restarts  PCP: No Ref-Primary, Physician        Pertinent Background:  Diagnoses include depression, anxiety, and OCD. His OC symptoms are documented in my note of 10/25/2019. They focus on dqoy-it-pweubl obsessions (particularly sexual harm) and reassurance/mental review compulsions.   Psych critical item history includes [no critical items].     Interim History     [4, 4]     At our last visit, his OC symptoms had worsened sufficiently that he was planning to attend the [facility] inpatient/residential program. He was there from January to May.  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 He felt his contamination fears became stronger.  OCD \"symptomatically there were improvements\". He was able to habituate to, e.g. videos that were causing him anxiety.  Feels as though he has same level of OC symptoms, but they're less depressing. Still fleeting SI, but can push out of head easier.    Guesses he has a thought/obsession every 15 minutes. Some greater ability to push them out of mind or avoid responding to them, compared to prior visits.  Rituals many small times per day.  Notes that the impairment from rituals is more on ADLs, like cleaning apartment, engaigng socially with others (b/c of avoidance). Not as much with things like ability to work.    Currently \"I'm burnt out\" on exposure, though has a person identified (see above). It is not clear that she has exposure experience.    Medications tried recently:     Increasing his fluoxetine to 100mg -- \"for 1.5 to 2 months\". Stopped due to the " "side effects of this combo'ed with Seroquel, see below. Was partly effective at slowing down obsession.       Still at 10mg, tapering off.    Quetiapine to 175mg -- gained \"a ton of weight\". Slowed down the thoughts, reduced intensity of obsessions.  Aripiprazole to 5mg (still current). \"It was a big boost\" in terms of increasing motivation, desire to do things. Not a clear change in OCD    Clomipramine, currently 125mg, levels were drawn and were high, thought to be an interaction with Prozac. Was over 600. \"really bad joint pains in my knees.\" This happened very rapid onset, hence he does not think it is an orthopedic issue. This is also causing weight gain.  Preventing him from going to the gym.    N-acetylcysteine, 1800 BID. Not sure if it is doing anything. Ran out last week, had a headache.    Wellbutrin -- bad anxiety, sweating profusely. Dose uncertain.    Sertraline, tried before starting clomipramine, but \"just for one day to appease the insurance company\".        Recent Substance Use:  none reported        Social/ Family History      [1ea,1ea]            [per patient report]               Has moved to own apartment. Fewer triggers, but recognizes this as avoidance.  Has returned to work. Able to handle this despite OCD as \"I get to take a lot of breaks\".      Medical / Surgical History                                 Patient Active Problem List   Diagnosis     Mood disorder (H)     Other obsessive-compulsive disorders     Elevated liver enzymes     High glucose     Morbid obesity (H)     Mixed hyperlipidemia       No past surgical history on file.     Medical Review of Systems         [2,10]   Major weight gain from clomipramine, nearly 100lbs. Has had testing via PCP, no evidence of diabetes per se, but sugars high and started metformin.  Had some elevated liver enzymes thought to be secondary to meds and weight gain.  Knee pain as above.    Allergy    No known allergies  Current Medications    "     Current Outpatient Medications   Medication Sig Dispense Refill     acetylcysteine () 600 MG CAPS capsule Take by mouth 2 times daily 3 tabs twice daily       ARIPiprazole (ABILIFY) 5 MG tablet        clomiPRAMINE (ANAFRANIL) 25 MG capsule 125mg daily       Omega-3 Fatty Acids (FISH OIL PO)        FLUoxetine (PROZAC) 20 MG capsule TK 1 C PO QAM ALONG WITH A 40 MG C  2     INOSITOL PO        MELATONIN PO Take by mouth nightly as needed       metFORMIN (GLUCOPHAGE) 500 MG tablet Take 1 tablet (500 mg) by mouth 2 times daily (with meals) (Patient not taking: Reported on 6/26/2020) 90 tablet 1     Vitals         [3, 3]   There were no vitals taken for this visit.     Mental Status Exam        [9, 14 cog gs]     Alertness: alert  and oriented  Appearance: adequately groomed  Behavior/Demeanor: cooperative, with good  eye contact   Speech: regular rate and rhythm  Language: intact  Psychomotor: normal or unremarkable  Mood: depressed and anxious  Affect: restricted; was congruent to mood; was congruent to content  Thought Process/Associations: unremarkable  Thought Content:  Reports chronic SI;  Denies none  Perception:  Reports none;  Denies auditory hallucinations and visual hallucinations  Insight: good  Judgment: good  Cognition: (6) does  appear grossly intact; formal cognitive testing was not done      Labs and Data                          Rating Scales:      YBOCS=23 today. Was 29 in 2019.    PHQ9 Today:  10  PHQ 12/20/2019 5/28/2020 6/26/2020   PHQ-9 Total Score 20 5 10   Q9: Thoughts of better off dead/self-harm past 2 weeks More than half the days Not at all Several days       My notes document adequate dose-duration of:  Fluoxetine (to 100mg)  Escitalopram  Clomipramine (confirmed by blood level)    Bupropion was not tolerable.  Sertraline was not an actual trial.    Augmentation trials include:  Quetiapine   Aripiprazole     Has tried N-acetylcysteine, without benefit.    Paroxetine was tried, but  not likely at adequate dose, and only when much younger.  Fluvoxamine was rejected by insurance due to insufficient prior med trials.      His YBOCS has ranged from 26 to 29.      Diagnosis      Obsessive-compulsive disorder     Assessment      [m2, h3]     TODAY:   He continues to have severe OC symptoms and is interested in neurosurgical options, which are becoming more available here at [facility] (clinical trial to start in late 2020; HDE options may be available after that). He is closer to meeting DBS criteria, but not there yet.    Those have  3 items:  1) severe OCD, 2) adequate dose of ERP, and 3) adequate medication trials. He has clearly had adequate ERP, but the medication trials still need at least one more SRI, and at this very moment, his YBOCS is too low.    That said, his YBOCS is too low because of a medication that he cannot tolerate, I.e. the cure is worse than the disease (and this level of weight gain and metabolic derangement will, in the long run, be lethal). So there is a compelling argument for offering surgical treatment in order to mitigate the side effects of said medications. I am going to discuss this further with the broader intergventional group, as it is not purely my call. Given that he has some treatment responsiveness, it would seem there is a good prognosis if he does get a DBS.      [place] Prescription Monitoring Program [] review was not needed today.    PSYCHOTROPIC DRUG INTERACTIONS: none clinically relevant    Plan                                                                                                                     m2, h3     1) MEDICATION:  If clomipramine is not working for him due to weight gain, there are two common strategies he has not tried. First, fluvoxamine, which insurance may now cover. Second, sertraline, but titrated to high dose, as high as 400mg (definitely at 200mg if possible). These should not have the weight side effects and may offer some symptom  control.    I would tend to prefer a higher-potency neuroleptic as augmenter, e.g. haloperidol, risperidone, or paloperidone over Abilify. Abilify is not, however, a wrong choice.    2) THERAPY:  Continue outpatient ERP work; I have encouraged this again in his after visit summary.      3) OTHER COMPONENTS:  As above, exploring the DBS option, potentially through an HDE pathway.      RTC: At next available (3-4 weeks) to discuss further re surgical options.    CRISIS NUMBERS:   Provided routinely in AVS.    Treatment Risk Statement:  The patient understands the risks, benefits, adverse effects and alternatives. Agrees to treatment with the capacity to do so. No medical contraindications to treatment. Agrees to call clinic for any problems. The patient understands to call 911 or go to the nearest ED if life threatening or urgent symptoms occur.     PROVIDER:  Bryan Hughes MD    Time based billing; 30min spent face to face with the patient with greater than 50% of time spent on  coordination of care, counseling and discussion of treatment alternatives.

## 2020-06-26 NOTE — PROGRESS NOTES
"Jevon Murillo is a 24 year old male who is being evaluated via a billable video visit.      The patient has been notified of following:     \"This video visit will be conducted via a call between you and your physician/provider. We have found that certain health care needs can be provided without the need for an in-person physical exam.  This service lets us provide the care you need with a video conversation.  If a prescription is necessary we can send it directly to your pharmacy.  If lab work is needed we can place an order for that and you can then stop by our lab to have the test done at a later time.    Video visits are billed at different rates depending on your insurance coverage.  Please reach out to your insurance provider with any questions.    If during the course of the call the physician/provider feels a video visit is not appropriate, you will not be charged for this service.\"    Patient has given verbal consent for Video visit? Yes    Will anyone else be joining your video visit? No  Thank you  Geraldine Hirsch LPN      Video-Visit Details    Type of service:  Video Visit    Video Start Time: 2:55 PM  Video End Time: 3:34 PM    Originating Location (pt. Location): Home    Distant Location (provider location):  [facility] PSYCHIATRY     Platform used for Video Visit: Misael Hughes MD        "

## 2020-07-20 ENCOUNTER — ANCILLARY PROCEDURE (OUTPATIENT)
Dept: ULTRASOUND IMAGING | Facility: CLINIC | Age: 25
End: 2020-07-20
Attending: FAMILY MEDICINE
Payer: COMMERCIAL

## 2020-07-20 DIAGNOSIS — R74.8 ELEVATED LIVER ENZYMES: ICD-10-CM

## 2020-07-27 NOTE — RESULT ENCOUNTER NOTE
Hi    Ultrasound show fatty liver.discuss your medications with your mental health provider.    The blood test for liver enzymes along with hepatitis profile can be repeated next month.  Spleen, pancrease all looked good.    Please keep us posted with questions or concerns .      Best Regards,    Viji Zeng MD  [facility]  [phone]

## 2020-07-31 ENCOUNTER — VIRTUAL VISIT (OUTPATIENT)
Dept: PSYCHIATRY | Facility: CLINIC | Age: 25
End: 2020-07-31
Payer: COMMERCIAL

## 2020-07-31 DIAGNOSIS — F42.9 OBSESSIVE-COMPULSIVE DISORDER, UNSPECIFIED TYPE: Primary | ICD-10-CM

## 2020-07-31 ASSESSMENT — PATIENT HEALTH QUESTIONNAIRE - PHQ9: SUM OF ALL RESPONSES TO PHQ QUESTIONS 1-9: 19

## 2020-07-31 NOTE — PATIENT INSTRUCTIONS
"Today's Recommendations:  - I would stop the clomipramine (as a gradual taper) because it is causing significant metabolic harm that is not justifiable in the long run.  - I would replace it with either sertraline (as high as you can, up to 400mg, but at least 200mg) or fluvoxamine (at least 200mg).    It is OK to use Effexor or Cymbalta or another drug from that class if you prefer, but I would favor sertraline or fluvoxamine oer those.  - You can consider some clonazepam, a long-acting anti anxiety drug. It is not likely to do much but could help a little. It may be useful for sleep as well.  - You should restart exposure therapy. It is a critical piece of getting ready for DBS and will help   - See me in about 2-3 months, when you are in the middle of that medication trial.  We will continue our planning about DBS depending on how things go there.  - Get in touch with me via [employer] in about a month and let me know how things are going.    General Information:  Our Treatment-Resistant Disorders/Interventional Psychiatry clinic is what is often called a \"consultation\" or \"tertiary care\" clinic. That means we do not see patients long-term for medication management or talk therapy. We offer thorough evaluations, recommendations about medications/therapies you may have not yet tried, and in some cases, brain stimulation or office-based treatments. If you are likely to benefit from one of those advanced treatments, we will have talked about it today. Once that treatment is complete, we will see you once or twice afterwards to check in, and then you will return to getting ongoing psychiatric care from whomever you were seeing before you came for your evaluation with us. If for some reason you no longer have access to that clinician, we can help with a referral to our main [facility]. The only patients we see long-term are patients with implanted medical devices that require ongoing monitoring and " programming.     Our recommendations almost always include some kind of cognitive-behavioral therapy (CBT) if you are not getting it already. Brain and nerve stimulation treatments work best when combined with certain talk therapies. We make these recommendations because we strongly believe that, without the therapy piece, most people will not get better, or will have limited clinical benefit. It is often difficult or inconvenient to add therapy to an already busy schedule, but it is also necessary.    It is important to remember that, like all mental health treatments, our interventional therapies are not perfect. About one third of people will not feel better after treatment, and even when they work, they do not take away the symptoms entirely.If we have recommended something above, it means we think there is a better than even chance of it working, but things are never guaranteed. This is another reason we usually recommend CBT along with our advanced treatments -- it can address the symptoms that remain after the stimulation/ketamine treatment.       While we are waiting to implement the recommendations you and I have discussed, you should know what to do if your symptoms worsen. A variety of crisis numbers/resources are available. They include:    CRISIS GENERAL NUMBERS   [phone] ([phone])  OR  911     CRISIS INTERVENTION TEAM (CIT) - this is a POLICE UNIT, specially trained.  This website has information for all of [place]'s CITs. Lays out which areas have this team.  [email or web address]  However, one can just call 911 and ask for this special team.   If police need to be called, DO ask for this team.    CRISIS MOBILE TEAMS  [and see end of this phrase*]  [facility] -COPE: 24hrs/7days:    [phone]  (Adults > 17yo)    [phone]  (Child   < 18yo)                                       FRONT DOOR (during the day could call)   [phone]    [facility]  "-[phone] (Adult)  [phone] (Child)     [facility] -[phone] (Adult and Child)     [facility] -[phone] ([place] mobile crisis team; Adult and Child; 24hr)     [facility] -[phone] (Adult and Child)     CRISIS HOUSING  [facility] [place] [facility]                           [address]              [phone]  [facility]                                [address]                       [phone]  [facility]                               [address] [facility] [address]     [phone]   [facility]  [address]                [phone]    [place] EMERGENCY NUMBERS      Crisis Connection:                                [phone]    [facility]:     [phone]    Crisis Intervention:                                [phone] or [phone]     COPE: Mobile Team 24hrs/7days:    [phone]  (Adults > 19yo)                                                                            [phone]  (Child   < 18yo)  [facility]        [phone] 24/7 line and Mobile Team   [address] [place] [address]  DROP IN:  M-F: 8:00 am - 7:00 pm  Sat: 11:00 am - 3:00 pm   Sun: Closed     WALK-IN COUNSELING:  [facility]       [phone]    [address]:   M, W, F:  1:00-3:00PM    M-Th:  6:30-8:30PM    TRANS and LGBT  Call [employer] at [phone]. This service is staffed by trans people 24/7.   LGBT youth <24 contemplating suicide, call the [employer] [phone].    POISON CONTROL CENTER  [phone]    OR  go to nearest ER    CHILD  \"[facility] has a needs assessment team who will do an intake evaluation. Based on the results of the intake they will recommended inpatient admission, partial hospitalization, intensive " "outpatient or outpatient care. The number is [phone]. \"    CRISIS TEXT LINE  Http://www.crisistextline.org  FREE SUPPORT AT YOUR FINGERTIPS,   Crisis Text Line serves anyone, in any type of crisis, providing access to free,  support and information via the medium people already use and trust: text. Here s how it works:  1)  Text [phone] from anywhere in the [place], anytime, about any type of crisis.  2)  A live, trained Crisis Counselor receives the text and responds quickly.      The volunteer Crisis Counselor will help you move from a 'hot moment to a cool moment'    [facility] EMERGENCY NUMBERS      Crisis Connection:                                [phone]    [facility]:              [phone]    Crisis Intervention:                                [phone] or [phone]     COPE: Mobile Team 24hrs/7days:    [phone]  (Adults > 17yo)                                                                            [phone]  (Child   < 16yo)  [facility]        [phone]  CALL 24 hours a day.  [address] [place] [address]  DROP IN:  M-F: 8:00 am - 7:00 pm  Sat: 11:00 am - 3:00 pm   Sun: Closed    WALK-IN COUNSELIN)  [facility]                                  [phone]        [address]:                  M, W:      5:00-7:00PM       2)  [facility]                            [phone]        [place], [address] [facility]                T, Th:      6:00-8:00PM    TRANS and LGBT  Call [employer] at [phone]. This service is staffed by trans people .   LGBT youth <24 contemplating suicide, call the [employer] [facility] [phone].    POISON CONTROL CENTER  [phone]    OR  go to nearest ER    CHILD  \"[facility] has a needs assessment team who will do an intake evaluation. Based on the results of the intake they will recommended inpatient admission, partial " "hospitalization, intensive outpatient or outpatient care. The number is [phone] or [phone]. \"    CRISIS TEXT LINE  Http://www.crisistextline.org  FREE SUPPORT AT YOUR FINGERTIPS, 24/7  Crisis Text Line serves anyone, in any type of crisis, providing access to free, 24/7 support and information via the medium people already use and trust: text. Here s how it works:  1)  Text [phone] from anywhere in the [place], anytime, about any type of crisis.  2)  A live, trained Crisis Counselor receives the text and responds quickly.      The volunteer Crisis Counselor will help you move from a 'hot moment to a cool moment'      * A Community Paramedic (CP) is an advanced paramedic that works to increase access to primary and preventive care and decrease use of emergency departments, which in turn decreases health care costs. Among other things, CPs may play a key role in providing follow-up services after a hospital discharge to prevent hospital readmission. CPs can provide health assessments, chronic disease monitoring and education, medication management, immunizations and vaccinations, laboratory specimen collection, hospital discharge follow-up care and minor medical procedures. CPs work under the direction of an Ambulance Medical Director.   "

## 2020-07-31 NOTE — PROGRESS NOTES
"Jevon Murillo is a 24 year old male who is being evaluated via a billable video visit.      The patient has been notified of following:     \"This video visit will be conducted via a call between you and your physician/provider. We have found that certain health care needs can be provided without the need for an in-person physical exam.  This service lets us provide the care you need with a video conversation.  If a prescription is necessary we can send it directly to your pharmacy.  If lab work is needed we can place an order for that and you can then stop by our lab to have the test done at a later time.    Video visits are billed at different rates depending on your insurance coverage.  Please reach out to your insurance provider with any questions.    If during the course of the call the physician/provider feels a video visit is not appropriate, you will not be charged for this service.\"    Patient has given verbal consent for Video visit? Yes  How would you like to obtain your AVS? MyChart  If you are dropped from the video visit, the video invite should be resent to: Send to e-mail at: [email or web address]  Will anyone else be joining your video visit? No      Video-Visit Details    Type of service:  Video Visit    Video Start Time: 1:00  Video End Time: 1:31    Originating Location (pt. Location): Home    Distant Location (provider location):  [facility] PSYCHIATRY     Platform used for Video Visit: Misael Hughes MD      "

## 2020-07-31 NOTE — PROGRESS NOTES
[facility]  Psychiatry Clinic  PSYCHIATRIC PROGRESS NOTE     CARE TEAM: PCP: Viji Zeng Lidia is a 24 year old male who prefers the name Jevon and pronoun he, him, his.  Psychiatrist: Janae Sapp DNP/ARNP, [facility] (has seen once)  Therapist: None at present, considering Sruthi Chery at [facility] when restarts  PCP: No Ref-Primary, Physician        Pertinent Background:  Diagnoses include depression, anxiety, and OCD. His OC symptoms are documented in my note of 10/25/2019. They focus on reuy-rp-jomgwm obsessions (particularly sexual harm) and reassurance/mental review compulsions.   Psych critical item history includes [no critical items].     Interim History     [4, 4]     Overall he continues to struggle with daily symptoms and when he is having guilty ruminations he can feel very distressed and have thoughts that things would be better if he were no longer alive. He has had medication changes in the interval trying to manage side effects but overall he does not think depression or OCD got worse but he is still uncomfortable.    Stopped fluoxetine 2wks ago because of urinary retention and knee pain but only mild improvement in urinary retention after this change. Switched Seroquel  to abilify 5mg; no longer using seroquel because of too much weight gain.  Mood is stable but sleep has deteriorated waking several times during the night. He believes he has gained 70lbs weight gain since starting clomipramine.    OCD rituals and avoidance  He is not currently utilizing exposure therapy. He didn't fit well with his most recent provider and has been reluctant to restart until he can finish paying past medical bills. We discussed importance of exposure in DBS work.  Currently, avoiding being around family or friends and is working remotely. It is likely that OCD symptoms and rituals would be more severe in the absence of the current pandemic.  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 When he sees lots of people walking in and out he has lots of intrusive sexual thoughts (e.g. about are they attractive, how old are they)? And then hypervigilant, e.g. about whether a dog is near enough that he might inadvertently touch it?) He even feels driven to avoid watching movies with children in them. Feeling guilty. Overall he greatly hopes for further improvement.        Recent Symptoms:   Depression:  suicidal ideation, insomnia, weight changes and excessive guilt  OCD symptoms -SEE above  ADVERSE EFFECTS: weight gain and urinary retention     Recent Substance Use:  deferred        Social/ Family History      [1ea,1ea]            [per patient report]               Social history update  Financial stressor. Paying previous therapy bill form 1.5 yrs ago and challenging denial of coverage form his previous residential treatment    Medical / Surgical History                                 Patient Active Problem List   Diagnosis     Mood disorder (H)     Obsessive-compulsive disorder, unspecified type     Elevated liver enzymes     High glucose     Morbid obesity (H)     Mixed hyperlipidemia       NEW diagnosis Fatty Liver disease confirmed by ultrasound  No past surgical history on file.     Medical Review of Systems         [2,10]   No new items today    Allergy    No known allergies  Current Medications        Current Outpatient Medications   Medication Sig Dispense Refill     acetylcysteine () 600 MG CAPS capsule Take by mouth 2 times daily 3 tabs twice daily       ARIPiprazole (ABILIFY) 5 MG tablet        clomiPRAMINE (ANAFRANIL) 25 MG capsule 125mg daily       FLUoxetine (PROZAC) 20 MG capsule TK 1 C PO QAM ALONG WITH A 40 MG C  2     INOSITOL PO        MELATONIN PO Take by mouth nightly as needed       metFORMIN (GLUCOPHAGE) 500 MG tablet Take 1 tablet (500 mg) by mouth 2 times daily (with meals) (Patient not taking:  Reported on 6/26/2020) 90 tablet 1     Omega-3 Fatty Acids (FISH OIL PO)        Vitals         [3, 3]   There were no vitals taken for this visit.   Mental Status Exam        [9, 14 cog gs]     Alertness: alert  and oriented  Appearance: casually groomed, beard and dark hair uncombed  Behavior/Demeanor: cooperative, with good  eye contact   Speech: regular rate and rhythm  Language: intact  Psychomotor: normal or unremarkable  Mood: depressed and anxious  Affect: restricted; was congruent to mood; was congruent to content  Thought Process/Associations: unremarkable  Thought Content:  Reports chronic SI;  Denies none  Perception:  Reports none;  Denies auditory hallucinations and visual hallucinations  Insight: good  Judgment: good  Cognition: (6) does  appear grossly intact; formal cognitive testing was not done    Labs and Data                        PHQ9 Today:  19  PHQ 5/28/2020 6/26/2020 7/31/2020   PHQ-9 Total Score 5 10 19   Q9: Thoughts of better off dead/self-harm past 2 weeks Not at all Several days Several days     My notes document adequate dose-duration of:  Fluoxetine (to 100mg); effective but caused weight gain and urinary retention  Escitalopram  Clomipramine (confirmed by blood level); severe weight gain and fatty liver diagnosis     Bupropion was not tolerable.  Sertraline was not an actual trial.     Augmentation trials include:  Quetiapine  To 175; severe weight gain  Aripiprazole      Has tried N-acetylcysteine, without benefit.     Paroxetine was tried, but not likely at adequate dose, and only when much younger.  Fluvoxamine was rejected by insurance due to insufficient prior med trials    Diagnosis      Obsessive-compulsive disorder     Assessment      [m2, h3]     TODAY: He continues to have intolerable side effects from his oral medications but no further improvement in his OCD symptoms .  YBOCS not done today but likely still a 23-24. Current drastic weight again has not been relieved by  stopping seroquel. I am strongly feeling that this is caused by the clomipramine, and as such, I would consider this an unsuccessful trial. He is getting symptom relief, but hepatosteatosis is not sustainable and there is little value in a medicaiton that causes significant organ damage.    He is still primarily interested in DBS. To be qualified, he needs one more SSRI trial. See below for specific recommendations, but I think it is time to stop the clomipramine and do that trial. I would consider re-attempting TMS authorization, but it is unlikely to be successful.      [place] Prescription Monitoring Program [] review was not needed today.    PSYCHOTROPIC DRUG INTERACTIONS: None.    Plan                                                                                                                     m2, h3     1) MEDICATION:  - Discontinue clomipramine as it is intolerable.  - Trial a high-dose selective serotonergic agent. My strong preferences in this are either sertraline (as close to 400mg as he can tolerate; must be above 200mg) or fluvoxamine (ideally 300; 200mg OK).  - Can consider  venlafaxine, desvenlafaxine or duloxetine if desired, but not as preferable.  - Can also consider adjunctive benzodiazepine, such as clonazepam (e.g. 0.5 HS) to help with sleep and anxiety.    2) THERAPY:  Recommend return to exposure therapy. Aware of financial constraints at this time but very important to his on going hope for recovery. I will follow up on this.    3) OTHER COMPONENTS:  N/A      RTC: 8wk    CRISIS NUMBERS:   Provided routinely in AVS.    Treatment Risk Statement:  The patient understands the risks, benefits, adverse effects and alternatives. Agrees to treatment with the capacity to do so. No medical contraindications to treatment. Agrees to call clinic for any problems. The patient understands to call 911 or go to the nearest ED if life threatening or urgent symptoms occur.     PROVIDER:  Bryan Hughes,  MD    Time based billing; 30min spent face to face with the patient with greater than 50% of time spent on  coordination of care, counseling and discussion of treatment alternatives.    Physician Attestation   I, Bryan Hughes MD, saw this patient and agree with the findings and plan of care as documented in the note.      Items personally reviewed/procedural attestation: I was present for and supervised the entire vsiit procedure.    Bryan Hughes MD

## 2020-10-06 ENCOUNTER — TELEPHONE (OUTPATIENT)
Dept: PSYCHIATRY | Facility: CLINIC | Age: 25
End: 2020-10-06

## 2020-10-06 NOTE — TELEPHONE ENCOUNTER
PSYCHIATRY CLINIC PHONE INTAKE     SERVICES REQUESTED / INTERESTED IN          Other:  Joaquina Blount    Presenting Problem and Brief History                              What would you like to be seen for? (brief description):  Pt was diagnosed within the last 10 years. He currently takes 200mg of sertraline and abilify 2.5mg. Pt is working with  at the [facility], and Dr.Erica Cool at [facility] for med management.  recommended Joaquina Blount to work with for therapy services mainly for OCD. He has harm OCD and sexually intrusive thoughts.  Pt also has some insomnia. No history or self-harm or trauma.   Have you received a mental health diagnosis? Yes   Which one (s): Major Depression, ADHD, OCD, and YAMILE  Is there any history of developmental delay?  Yes   Are you currently seeing a mental health provider?  No            Who / month last seen:  See above  Do you have mental health records elsewhere?  Yes - [facility] in [place] and [place].   Will you sign a release so we can obtain them?  Yes    Have you ever been hospitalized for psychiatric reasons?  Yes  Describe:  Pt was at [facility] in residential program for OCD in [place]    Do you have current thoughts of self-harm?  No    Do you currently have thoughts of harming others?  No       Substance Use History     Do you have any history of alcohol / illicit drug use?  No  Describe:  NA  Have you ever received treatment for this?  No    Describe:  NA     Social History     Who is the patient's a guardian?  No    Name / number: NA  Have you had an ACT team in last 12 months?  No  Describe: NA   Do you have any current or past legal issues?  No  Describe: NA   OK to leave a detailed voicemail?  Yes    Medical/ Surgical History                                   Patient Active Problem List   Diagnosis     Mood disorder (H)     Obsessive-compulsive disorder, unspecified type     Elevated liver enzymes     High glucose     Morbid obesity  (H)     Mixed hyperlipidemia          Medications             Current Outpatient Medications   Medication Sig Dispense Refill     acetylcysteine () 600 MG CAPS capsule Take by mouth 2 times daily 3 tabs twice daily       ARIPiprazole (ABILIFY) 5 MG tablet        clomiPRAMINE (ANAFRANIL) 25 MG capsule 125mg daily       FLUoxetine (PROZAC) 20 MG capsule TK 1 C PO QAM ALONG WITH A 40 MG C  2     INOSITOL PO        MELATONIN PO Take by mouth nightly as needed       metFORMIN (GLUCOPHAGE) 500 MG tablet Take 1 tablet (500 mg) by mouth 2 times daily (with meals) (Patient not taking: Reported on 6/26/2020) 90 tablet 1     Omega-3 Fatty Acids (FISH OIL PO)            DISPOSITION      10/6/20 Intake completed. Sending to Joaquina Blount for review.     Kimberly Keith,     10/9/20 Spoke with pt and informed him Joaquina doesn't have openings for new referrals. Offered to add pt to adult therapy [facility], but pt declined.     Kimberly Keith,

## 2020-12-18 ENCOUNTER — MEDICAL CORRESPONDENCE (OUTPATIENT)
Dept: HEALTH INFORMATION MANAGEMENT | Facility: CLINIC | Age: 25
End: 2020-12-18

## 2020-12-18 DIAGNOSIS — F42.9 OCD (OBSESSIVE COMPULSIVE DISORDER): Primary | ICD-10-CM

## 2021-01-03 ENCOUNTER — HEALTH MAINTENANCE LETTER (OUTPATIENT)
Age: 26
End: 2021-01-03

## 2021-04-25 ENCOUNTER — HEALTH MAINTENANCE LETTER (OUTPATIENT)
Age: 26
End: 2021-04-25

## 2021-05-20 NOTE — TELEPHONE ENCOUNTER
EKG signed.   Placed at [facility] for faxing.     Adelita   [facility] [address]  [address], [address] [phone] fax    Continued Plan of Care/ Re-certification for Physical Therapy Services    Patient name: Radha Levy Start of Care: 2021   Referral source: Sandra Cardenas MD : 1956               Medical Diagnosis: Pain in left foot [M79.672]  Payor: [place] MEDICARE / Plan: [place] MEDICARE PART A & B / Product Type: Medicare /     Onset Date:2021               Treatment Diagnosis: left foot pain   Prior Hospitalization: see medical history Provider#: [id number]   Medications: Verified on Patient summary List    Comorbidities: heart disease with hx of MI, left TKA, OA, thyroid issues, HTN   Prior Level of Function: retired. Functionally independent. Limited left knee extension and LE strength as she was unable to rehab due to MI         Visits from Start of Care: 9    Missed Visits: 0    The Plan of Care and following information is based on the patient's current status:  Short Term Goals: To be accomplished in 1 weeks:  1. Therapist to establish HEP for ROM & Strengthening to improve ease with gait & ADLs.                MET      Long Term Goals: To be accomplished in 4 weeks:  1. Patient will be independent with HEP to improve carryover of functional gains with ADLs between visits.             Eval Status:n/a              MET  2. Pt will increase left ankle DF to -10 degrees to normalize gait pattern.               Eval Status DF: -30 deg              MET = lacking 5 degrees with knees flexed and extended  3. Pt will increase FOTO score to 63 points to demonstrate improved functional mobility              Eval Status:FOTO: 47              Met = 67  4. Pt will increase left ankle strength to grossly 5/5 with MMT to improve ankle stability for gait/balance.              Eval Status: DF: 4/5, PF:4/5, IV: 3/5, EV: 3/5              Partially met: DF = 4+/5, PF = 5/5, IV= 5/5, EV = 4/5  5.  Patient will increase left knee extension ROM to 0 degrees to improve le mechanics for gait.             Eval status: 10 deg from neutral              No change = lacking 10 degrees    Key functional changes:   Functional Gains: decreased pain, improved walking tolerance  Functional Deficits: continued pain, especially if she does not exercise, abnormal gait  % improvement: 50%  Pain   Average: 3/10       Best: 0/10     Worst: 3.5/10  Patient Goal: \"to be able to walk normally without pain\"      Problems/ barriers to goal attainment: pain,     Problem List: pain affecting function, decrease ROM, decrease strength, impaired gait/ balance, decrease ADL/ functional abilitiies, decrease activity tolerance, decrease flexibility/ joint mobility and decrease transfer abilities    Treatment Plan: Therapeutic exercise, Therapeutic activities, Neuromuscular re-education, Physical agent/modality, Gait/balance training, Manual therapy, Aquatic therapy, Patient education, Self Care training, Functional mobility training, Home safety training and Stair training     Goals for this certification period to be accomplished in 4 weeks:  1. Pt will increase left ankle DF to 0 degrees to normalize gait pattern.               recert status: updated goal: lacking 5 degrees with knees flexed and extended  2. Pt will increase left ankle DF/EV strength to grossly 5/5 with MMT to improve ankle stability for gait/balance.              recert status:  DF = 4+/5, EV= 4/5  3. Patient will increase left knee extension ROM to 0 degrees to improve le mechanics for gait.             recert status: No change = lacking 10 degrees    Frequency / Duration: Patient to be seen 1-2 times per week for 4 weeks:    Assessment / Recommendations:Ms. Bautista reports 50% improvement since beginning therapy. Overall, pt pain has reduced and is well managed with exercise.  Pt reports continued difficulty with start up pain in the morning and after prolonged sitting; she has recently started using a night splint and was educated to use every night. Pt knee strength and lack of TKE are a contributing factor to abnormal gait complaint. . Pt will benefit from continued skilled PT in order to address remaining deficits and maximize functional potential.    Certification Period: 5/21/21-6/18/21    Kit Short, PT 5/20/2021 10:15 AM    ________________________________________________________________________  I certify that the above Therapy Services are being furnished while the patient is under my care. I agree with the treatment plan and certify that this therapy is necessary. [] I have read the above and request that my patient continue as recommended.   [] I have read the above report and request that my patient continue therapy with the following changes/special instructions: _____________________________________________  [] I have read the above report and request that my patient be discharged from therapy    Physician's Signature:____________Date:_________TIME:________     Leslie Maddox MD  ** Signature, Date and Time must be completed for valid certification **    Please sign and return to [facility] [address], [address]  [phone] fax

## 2021-10-10 ENCOUNTER — HEALTH MAINTENANCE LETTER (OUTPATIENT)
Age: 26
End: 2021-10-10

## 2022-05-21 ENCOUNTER — HEALTH MAINTENANCE LETTER (OUTPATIENT)
Age: 27
End: 2022-05-21

## 2022-09-18 ENCOUNTER — HEALTH MAINTENANCE LETTER (OUTPATIENT)
Age: 27
End: 2022-09-18

## 2023-06-04 ENCOUNTER — HEALTH MAINTENANCE LETTER (OUTPATIENT)
Age: 28
End: 2023-06-04

## 2024-09-22 ENCOUNTER — HEALTH MAINTENANCE LETTER (OUTPATIENT)
Age: 29
End: 2024-09-22

## 2024-11-04 NOTE — PROGRESS NOTES
" [facility]  [address], [address]  [place] [address]  Follow Up Patient Evaluation     Jevon Murillo is a 28 year old male who prefers the name Jevon and pronoun he, him, his.  Psychiatrist: none, working with his PCP  Therapist: Started in person therapy with RADHA Jha, documented as ERP-trained  PCP: . Filippo Delatorre W, [place].   Referred by  Kayden Tellez for evaluation of depression, anxiety and OCD       Pertinent Background                                                                                                     OCD since roughly age 8. Symptoms are vvax-pf-[id number] focused, with possibility of having done sexual harm or doing something inappropriate, or causing harm to animals inadvertently. Sexual harm to children was a prominent recent theme. Compulsions were initially reassurance-based, but drifted over time to be mental review/checking, e.g. making sure that things \"feel right\" in his head in terms of ensuring he has not done something wrong.     He has usually been able to work and function in role despite his symptoms.    ERP work had been with Moncho Tellez in [facility], primarily imaginal but some in vivo exposures which patient did complete. A challenge was that while he did manage to expose and habituate to specific triggers, this then caused a new OC component to replace them.  He did also spend 6-7 weeks in a [facility] in 2019, followed by residential in [place]. He did continue to do exposures, but had trouble climbing a hierarchy and/or generalizing. It has been challenging in part bc of the primarily mental ristuals.    Adequate medication trials have included    Escitalopram, fluoxetine, sertraline (at least 300mg).  Clomipramine trial in 2020 caused joint pain and weight gain. (>100 lbs)  Augmentation has included quetiapine (to 175mg, weight gain), aripiprazole (helped motivation but not OCD), risperidone (helped).  He reports taking " "clonazepam augmentation in [facility] and it was very helpful to help him leaving home, but this was a very short duration trial.  Other trials were fluoxetine, paroxetine, bupropion (intolerable), N-acetylcysteine (unhelpful)  Stimulants have often been helpful.    He did have good symptom control at some points in the past when on medications, I.e. he did previously have treatment responsivity.  He never was able to try H7 TMS due to an insurance issue.    Interim History                                                                              4, 4      Pertinent Background and Present Symptoms:  He has been lost to follow up for >3 years. In that time, he moved to [place] for 2 years was still experiencing severe OCD. Tried multiple medications including testosterone which was very helpful for reducing weight, anxiety and ADHD symptoms but not very much with OCD.  Then, he moved to  and [facility] as a \"digital nomad\", OCD stayed. Started first relationship and \"relationship related OCD\" (is this the right relationship and whether to have kids or not) which contributed to break up.     After stopping testosterone, everything started to fall a part. Started risperidone 2.5 mg and it was partially helpful but induced additional 10 pounds of weight gain.     He returned to the [facility] because of taking care of his mental health.     He reports that his OCD is currently consuming 5-8 hours per day to OCD. Reports getting anxious, baseline 6-7 and gets to 10 with exposure during ERP. He reports being able to tolerate the exposure.      Wakes up 8:30 and work is getting complex from early in the morning with ruminations and mental checking, concluding and they getting doubtful and try it again and again. He is still functional to get out of bed and do his morning routines, but if there is ay space the rumination interfere.     Therapy-wise, doing N-OCD exposure therapy while he was out of the state. He is not sure " how it was helpful. Re started individual ERP again recently.     Getting exposed to his sister's kids has been helpful but he still has challenges in the out side environment with obsessions about kids.     Just prescribed Concerta, not taken yet. We hope this will help his OCD symptoms. We will check this next visit.      Recent Substance Use:  TOBACCO- no     CAFFEINE- coffee/ tea [will take a caffeine supplement before working out, but has not used this in the last two months.]  ALCOHOL- 5 month sober as it was worsening his OCD  CANNABIS- yes, less than monthly.    OPIOIDS- no         NARCAN KIT- no                OTHER ILLICIT DRUGS- none        Psychiatric History     Suicidal ideation- Yes, chronic   Suicide Attempt:   #- 0   Most Recent- 0  SIB- None   Psych Hosp- None      Psychiatric Medication Trials     Adequate dose/duration:  Escitalopram, 20mg >6mo  Sertraline, >300mg >4mo    Uncertain:  Fluoxetine  Paroxetine    Limited by side effects:  Bupropion  Clomipramine (major weight gain)  Mirtazapine (same)    Other:  Stimulants, eli Vyvanse, have been helpful in past.  Augmentation with risperidone helpful but caused weight gain  Augmentation with aripiprazole did not affect OCD  Augemntation withq uetiapine not helpful    He also reports benefit from testosterone supplementation.         Social/ Family History               [per patient report]                                                 1ea, 1ea     LIVING SITUATION- with   CHILDREN- no   SOCIAL SUPPORT / RELATIONSHIPS- living with his parents for now  Patient notes that his parents and friends are supports.      He states that his parents are the primary people who he feel comfortable sharing his mental health symptoms with.     CULTURAL / SPIRITUAL- none provided     EDUCATION- Patient has a Bachelor's Degree  EMPLOYMENT- No job now.      FINANCIAL SUPPORT- Earned income        Trauma History (self-report)- no     Legal- no     FAMILY MENTAL  HEALTH HX- yes, his father has a history of Bipolar/Schizoaffective Disorders and ADD; mother has depression, anxiety, and PTSD; oldest brother has Bipolar Disorder; the second oldest brother has OCD and depression; sister has depression, anxiety and ADD.       Medical / Surgical History     Patient Active Problem List   Diagnosis    Mood disorder (H)    Obsessive-compulsive disorder, unspecified type    Elevated liver enzymes    High glucose    Morbid obesity (H)    Mixed hyperlipidemia       History reviewed. No pertinent surgical history.      Medical Review of Systems                                                                                                 2, 10   Reviewed. No major concerns.        Metals Screen   Yes No Item    X Implanted or lodged metals in body    X Implanted surgical devices    X Metal containing facial or scalp tattoos    X Non removable piercings   Seizure Screen  Yes No Item    X Current Seizure Disorder?    X History of Seizure?     Does patient have a cochlear implant? ___N_______  Does patient have any shunts?___N________  Does patient have a pacemaker?___N_______  Does patient have a vagus nerve stimulator?___N_______  Does patient have a deep brain stimulator?____N______  Any other implanted device?________N________       Allergy   No known allergies     Current Medications     Current Outpatient Medications   Medication Sig Dispense Refill    LORazepam (ATIVAN) 1 MG tablet Take 1 mg by mouth.      MELATONIN PO Take by mouth nightly as needed      Omega-3 Fatty Acids (FISH OIL PO)       risperiDONE (RISPERDAL) 0.5 MG tablet Take 0.5 mg by mouth.      risperiDONE (RISPERDAL) 2 MG tablet Take 2 mg by mouth at bedtime.      sertraline (ZOLOFT) 100 MG tablet Take 200 mg by mouth.      THEANINE PO Take by mouth.          Vitals                                                                                                                        3, 3     BP 94/60   Pulse 78    "Temp 98.1  F (36.7  C)   Ht 1.842 m (6' 0.5\")   Wt 86.4 kg (190 lb 6.4 oz)   SpO2 97%   BMI 25.47 kg/m        Mental Status Exam                                                                                   9, 14 cog gs     Alertness: alert  and oriented  Appearance: well groomed  Behavior/Demeanor: calm and cooperative while mildly guarded and anxious  Speech: WNR tone, speed and prosody  Language: intact  Psychomotor: normal or unremarkable  Mood: \"anxious\"  Affect: ; was congruent to mood; was congruent to content  Thought Process/Associations: unremarkable  Thought Content:  Reports preoccupations, obsessions  and magical thinking;  Denies suicidal ideation, violent ideation and delusions  Perception:  Reports none;  Denies auditory hallucinations and visual hallucinations  Insight: excellent  Judgment: fair  Cognition: (6) does  appear grossly intact; formal cognitive testing was not done  Gait and Station: unremarkable     Labs and Data     Rating Scales:    PHQ9 and YBOCS      YBOCS Today: O=15, C=12, total=27    PHQ9 Today:  18      6/26/2020     2:27 PM 7/31/2020    12:00 PM 11/5/2024     9:08 AM   PHQ-9 SCORE   PHQ-9 Total Score 10 19 9       Diagnosis and Assessment                                                                             m2, h3     Jevon Murillo is a 28 year old male with previous diagnosis of OCD, MDD and ADHD. OCD is of a fairly typical type, albeit with challenging checking rituals that are at least partially mental and appear to becoming more mental/internal with time.The OCD has been quite severe, with >3 hrs of impairment daily due to rituals.     He has historically been interested in DBS. There are six basic criteria:   YBOCS persistently > 24: Yes, see above, has been for years.   Inadequate response to SRIs: Clearly documented as above   Inadequate response/interolance of clomipramine: Clearly documented.   Inadequate response to augmentation: Has tried risperidone, " aripirazole, quetiapine without response.   Inadequate response to clonazepam: NOT met. Actually appears to respond and has not had a long trial of it. I don't think this will get him under 24, but he should still be on it.   Adquate dose/duration of ERP: Yes. Has done residential at [facility]. Is getting back into therapy now.      On this basis, he probably IS a DBS candidate, though I would still want to see the clonazepam trial.      Suicide Risk Assessment:  Today Jevon Murillo reports that he has periods where he feels intensely depressed and has thoughts of wanting to die, though he does not feel that way today. He has thought about overdosing on heroin in the past but does not have any plans of suicide at this time. In addition, he has notable risk factors for self-harm, including feels trapped, lives alone/ isolated, severe anxiety and male. However, risk is mitigated by no h/o suicide attempt, no plan or intent, no h/o risky impulsive behavior, no access to lethal means, describes a safety plan, h/o seeking help when needed, symptom improvement, future oriented, feeling hopeful, none to minimal alcohol use , commitment to family, stable housing and good job situation. He has also removed medication stock pile recently. Therefore, based on all available evidence including the factors cited above, he does not appear to be at imminent risk for self-harm, does not meet criteria for a 72-hr hold, and therefore involuntary hospitalization will not be pursued at this  time.No change in level of care was recommended. Additional steps taken to minimize risk include plan for optimizing medications that would address distressing OCD symptoms and Depression and considering strategies like Neuromodulation to target severe resistant symptoms and close collaborative care with primary providers.       [place] Prescription Monitoring Program [] review was not needed today.    PSYCHOTROPIC DRUG INTERACTIONS: none clinically  relevant    Plan                                                                                                                     m2, h3     1) Obsessive compulsive disorder - mixed obsessions and compulsions      Depression NOS - moderate to severe    -- Medications:   Broadly, although I do not think medications are going to dramatically resolve his symptoms, there are things to try. In roughly this order:  A) He really needs to try scheduled clonazepam, e.g. 0.5mg-1mg BID. This is technically a requirement to proceed to DBS.    B) If the risperidone is not tolerable at this low dose, it should be replaced. Although Abilify did not work, another weight neutral agent (Geodon, Latuda, Rexulti) should be tried as a way to get him off the riseridone. The other option, which can work well in OCD patients, is cross tapering to haloperidol, which may also have fewer weight effects.    C) I would love to see his sertraline at 400mg if he can tolerate the titration. I do not think this is going to resolve the OCD, but it would help take the edge off.    He also wondered about restarting testosterone. I have no objection, it sounds like it helps, but this is not an evidence-based approahc to OCD and I do not offer long term medication management in my clinic due to a focus on interventions.      -- Psychotherapy:   Being engaged in ERP is a pre-requisite for DBS. He is restarting.    -- Procedures:  As above, I would be willing to consider him as a DBS candidate at this point. He is still not entirely certain if he wants to proceed to surgery and is going to ponder it. I will follow up with him in a few months.    We did also discuss psychedelics for OCD, which do not yet have an evidence base sufficient to make me want to prescribe them.    -- Referrals: None      3)  RTC: As needed when ready to consider surgery      CRISIS NUMBERS:   Provided routinely in AVS.    Treatment Risk Statement:  The patient understands the  risks, benefits, adverse effects and alternatives. Agrees to treatment with the capacity to do so. No medical contraindications to treatment. Agrees to call clinic for any problems. The patient understands to call 911 or go to the nearest ED if life threatening or urgent symptoms occur.        PROVIDER:  Bryan Hughes MD.     On day of service, time spent was    5 min on chart review  30 min with patient  30 min on note writing and follow up messages    Physician Attestation   I, Bryan Hughes MD, saw this patient and agree with the findings and plan of care as documented in the note.      Items personally reviewed/procedural attestation: I was present for and supervised the entire  procedure.    Bryan Hughes MD

## 2024-11-05 ENCOUNTER — OFFICE VISIT (OUTPATIENT)
Dept: PSYCHIATRY | Facility: CLINIC | Age: 29
End: 2024-11-05
Payer: COMMERCIAL

## 2024-11-05 VITALS
TEMPERATURE: 98.1 F | DIASTOLIC BLOOD PRESSURE: 60 MMHG | WEIGHT: 190.4 LBS | SYSTOLIC BLOOD PRESSURE: 94 MMHG | HEIGHT: 73 IN | OXYGEN SATURATION: 97 % | BODY MASS INDEX: 25.23 KG/M2 | HEART RATE: 78 BPM

## 2024-11-05 DIAGNOSIS — F42.9 OBSESSIVE-COMPULSIVE DISORDER, UNSPECIFIED TYPE: Primary | ICD-10-CM

## 2024-11-05 RX ORDER — SERTRALINE HYDROCHLORIDE 100 MG/1
200 TABLET, FILM COATED ORAL
COMMUNITY
Start: 2024-09-03

## 2024-11-05 RX ORDER — RISPERIDONE 0.5 MG/1
0.5 TABLET ORAL
COMMUNITY
Start: 2024-09-03

## 2024-11-05 RX ORDER — RISPERIDONE 2 MG/1
2 TABLET ORAL AT BEDTIME
COMMUNITY

## 2024-11-05 RX ORDER — LORAZEPAM 1 MG/1
1 TABLET ORAL
COMMUNITY

## 2024-11-05 ASSESSMENT — PATIENT HEALTH QUESTIONNAIRE - PHQ9: SUM OF ALL RESPONSES TO PHQ QUESTIONS 1-9: 9

## 2024-11-05 NOTE — PATIENT INSTRUCTIONS
Today's Recommendations:  - We don't have any objection about resuming testosterone while its side effect profile is still not clear. Honestly, your best bet on this is either PCP or going the online route.  - We had an extensive discussion about DBS for OCD as usual. I do think this is your best option going forward. You have been through enough of the med algorithm, and the symptoms are severe neough, that it is unlikely that meds alone will get you your next steps.  - That said -- try scheduled daily clonazepam. It is part of the pre-DBS algorithm and it sounds like it helped in [facility].That is something Dr Holguin should be able to help with.  - I have been searching for a good pahrmacologist in your [facility], with no success. Yet. I have reached out and you could ask me again in 2 weeks to see if I got any replies.  - Some general OCD DBS info (updated since you last saw me):    We talked a bit about deep brain stimulation, or DBS, which I think might be appropriate for you. It is a surgical treatment, and that does carry risks, but in most patients, the potential for benefit vastly outweighs the risks. 2/3 of patients with severe OCD who have DBS ultimately find that it helped them.  You can learn more about DBS at:          https://iocdf.org/expert-opinions/expert-opinion-dbs/ (old but basically still accurate)          https://iocdf.org/about-ocd/ocd-treatment/dbs/          https://VOICEPLATE.COM.Videojug.[employer]/file/d/[email or web address]-/view?usp=sharing          https://[email or web address].com/file/d/[email or web address]-[email or web address]/view?usp=sharing  If this is of further interest, we should have another hour-long appointment to describe what it entails.       If you want to hear a bit about the lived experience, this video is a patient of mine who gave a talk about his experience: https://www.youMinimally invasive devicesube.com/watch?v=[email or web address]&list=[email or web address]&index=5    Or the instagram of a  "different patient:  https://www.[employer].com/livingwith_uncertainty/      We are specifically a university and research clinic, and there are often research studies ongoing. Some of those are clinical trials of new brain stimulation treatments. Others are what we would call \"biomarker\" studies, where we ask you to participate in some kind of measurement while you are undergoing regular treatment.   If you are interested in hearing about research consider signing up for our research registry. This will allow researchers in our clinic to reach out if you become eligible for a study. Sign up today at [email or web address]    In some cases, a clinical trial is the only way to get access to an advanced treatment that is not covered by your insurance. Usually, we will talk about this in your visit if it is an option.      Patient Education       General Information:  Our Treatment-Resistant Disorders/Interventional Psychiatry clinic is what is often called a \"consultation\" or \"tertiary care\" clinic. That means we do not see patients long-term for medication management or talk therapy. We offer thorough evaluations, recommendations about medications/therapies you may have not yet tried, and in some cases, brain stimulation or office-based treatments. If you are likely to benefit from one of those advanced treatments, we will have talked about it today. Once that treatment is complete, we will see you once or twice afterwards to check in, and then you will return to getting ongoing psychiatric care from whomever you were seeing before you came for your evaluation with us. If for some reason you no longer have access to that clinician, we can help with a referral to our main [facility]. The only patients we see long-term are patients with implanted medical devices that require ongoing monitoring and programming.     Our recommendations almost always include some kind of cognitive-behavioral therapy " (CBT) if you are not getting it already. Brain and nerve stimulation treatments work best when combined with certain talk therapies. We make these recommendations because we strongly believe that, without the therapy piece, most people will not get better, or will have limited clinical benefit. It is often difficult or inconvenient to add therapy to an already busy schedule, but it is also necessary.    It is important to remember that, like all mental health treatments, our interventional therapies are not perfect. About one third of people will not feel better after treatment, and even when they work, they do not take away the symptoms entirely.If we have recommended something above, it means we think there is a better than even chance of it working, but things are never guaranteed. This is another reason we usually recommend CBT along with our advanced treatments -- it can address the symptoms that remain after the stimulation/ketamine treatment.       While we are waiting to implement the recommendations you and I have discussed, you should know what to do if your symptoms worsen. A variety of crisis numbers/resources are available. They include:    CRISIS GENERAL NUMBERS   6-908-BFFPUKD ([phone])  OR  911     CRISIS INTERVENTION TEAM (CIT) - this is a POLICE UNIT, specially trained.  This website has information for all of [place]'s CITs. Lays out which areas have this team.  Http://cit.[facility]/citmap/[place].php  However, one can just call 911 and ask for this special team.   If police need to be called, DO ask for this team.    CRISIS MOBILE TEAMS  [and see end of this phrase*]  [facility] -COPE: 24hrs/7days:    [phone]  (Adults > 19yo)    [phone]  (Child   < 18yo)                                       FRONT DOOR (during the day could call)   [phone]    [facility] -[phone] (Adult)  [phone] (Child)     [facility] -[phone] (Adult and  "Child)     [facility] -[phone] ([employer] mobile crisis team; Adult and Child; 24hr)     [facility] and [facility] -[phone] (Adult and Child)     CRISIS HOUSING  [facility]                           [address]              [phone]  [facility]                                [address]                       [phone]  [facility]                               [address]     [phone]   [facility]  [address]                [phone]    [place] EMERGENCY NUMBERS    Crisis Connection:                                [phone]  [facility]:     [phone]  Crisis Intervention:                                [phone] or [phone]   COPE: Mobile Team 24hrs/7days:    [phone]  (Adults > 17yo)                                                                            [phone]  (Child   < 18yo)  [facility]        [phone] 24/7 line and Mobile Team   [address] [place] [address]  DROP IN:  M-F: 8:00 am - 7:00 pm  Sat: 11:00 am - 3:00 pm   Sun: Closed     WALK-IN COUNSELING:  [facility]       [phone]    [address]:   M, W, F:  1:00-3:00PM    M-Th:  6:30-8:30PM    TRANS and LGBT  Call [employer] at [phone]. This service is staffed by trans people 24/7.   LGBT youth <24 contemplating suicide, call the [employer] [phone].    POISON CONTROL CENTER  [phone]    OR  go to nearest ER    CHILD  \"[facility] has a needs assessment team who will do an intake evaluation. Based on the results of the intake they will recommended inpatient admission, partial hospitalization, intensive outpatient or outpatient care. The number is [phone]. \"    CRISIS TEXT LINE  Http://www.crisistextline.org  FREE " "SUPPORT AT YOUR FINGERTIPS,   Crisis Text Line serves anyone, in any type of crisis, providing access to free,  support and information via the medium people already use and trust: text. Here s how it works:  1)  Text [phone] from anywhere in the [place], anytime, about any type of crisis.  2)  A live, trained Crisis Counselor receives the text and responds quickly.      The volunteer Crisis Counselor will help you move from a 'hot moment to a cool moment'    [facility] EMERGENCY NUMBERS    Crisis Connection:                                [phone]  [facility]:              [phone]  Crisis Intervention:                                [phone] or [phone]   COPE: Mobile Team 24hrs/7days:    [phone]  (Adults > 19yo)                                                                            [phone]  (Child   < 16yo)  [facility]        [phone]  CALL 24 hours a day.  [address] [place] [address]  DROP IN:  M-F: 8:00 am - 7:00 pm  Sat: 11:00 am - 3:00 pm   Sun: Closed    WALK-IN COUNSELIN)  [facility]                                  [phone]        [address]:                  M, W:      5:00-7:00PM       2)  [facility]                            [phone]        [address]                T, Th:      6:00-8:00PM    TRANS and LGBT  Call [employer] at [phone]. This service is staffed by trans people .   LGBT youth <24 contemplating suicide, call the [employer] [phone].    POISON CONTROL CENTER  [phone]    OR  go to nearest ER    CHILD  \"[facility] has a needs assessment team who will do an intake evaluation. Based on the results of the intake they will recommended inpatient admission, partial hospitalization, intensive outpatient or outpatient care. The number is [phone] or [phone]. \"    CRISIS TEXT " LINE  Http://www.crisistextline.org  FREE SUPPORT AT YOUR FINGERTIPS, 24/7  Crisis Text Line serves anyone, in any type of crisis, providing access to free, 24/7 support and information via the medium people already use and trust: text. Here s how it works:  1)  Text 217-468 from anywhere in the [place], anytime, about any type of crisis.  2)  A live, trained Crisis Counselor receives the text and responds quickly.      The volunteer Crisis Counselor will help you move from a 'hot moment to a cool moment'      * A Community Paramedic (CP) is an advanced paramedic that works to increase access to primary and preventive care and decrease use of emergency departments, which in turn decreases health care costs. Among other things, CPs may play a key role in providing follow-up services after a hospital discharge to prevent hospital readmission. CPs can provide health assessments, chronic disease monitoring and education, medication management, immunizations and vaccinations, laboratory specimen collection, hospital discharge follow-up care and minor medical procedures. CPs work under the direction of an Ambulance Medical Director.

## 2024-11-05 NOTE — LETTER
"11/5/2024       RE: Jevon Murillo   [address]  [place] [address]     Dear Colleague,    Thank you for referring your patient, Jevon Murillo, to the  [facility] at . Please see a copy of my visit note below.     [facility]  [address], [address]  [place] [address]  Follow Up Patient Evaluation     Jevon Murillo is a 28 year old male who prefers the name Jevon and pronoun he, him, his.  Psychiatrist: none, working with his PCP  Therapist: Started in person therapy with ANA PAULA Jha, documented as ERP-trained  PCP: . Filippo Delatorre W, D.O.   Referred by  Kayden Tellez for evaluation of depression, anxiety and OCD       Pertinent Background                                                                                                     OCD since roughly age 8. Symptoms are [id number] focused, with possibility of having done sexual harm or doing something inappropriate, or causing harm to animals inadvertently. Sexual harm to children was a prominent recent theme. Compulsions were initially reassurance-based, but drifted over time to be mental review/checking, e.g. making sure that things \"feel right\" in his head in terms of ensuring he has not done something wrong.     He has usually been able to work and function in role despite his symptoms.    ERP work had been with Moncho Tellez in [facility] primarily imaginal but some in vivo exposures which patient did complete. A challenge was that while he did manage to expose and habituate to specific triggers, this then caused a new OC component to replace them.  He did also spend 6-7 weeks in a [facility] in 2019, followed by residential in [place]. He did continue to do exposures, but had trouble climbing a hierarchy and/or generalizing. It has been challenging in part bc of the primarily mental ristuals.    Adequate medication trials have " "included    Escitalopram, fluoxetine, sertraline (at least 300mg).  Clomipramine trial in 2020 caused joint pain and weight gain. (>100 lbs)  Augmentation has included quetiapine (to 175mg, weight gain), aripiprazole (helped motivation but not OCD), risperidone (helped).  He reports taking clonazepam augmentation in [facility] and it was very helpful to help him leaving home, but this was a very short duration trial.  Other trials were fluoxetine, paroxetine, bupropion (intolerable), N-acetylcysteine (unhelpful)  Stimulants have often been helpful.    He did have good symptom control at some points in the past when on medications, I.e. he did previously have treatment responsivity.  He never was able to try H7 TMS due to an insurance issue.    Interim History                                                                              4, 4      Pertinent Background and Present Symptoms:  He has been lost to follow up for >3 years. In that time, he moved to [place] for 2 years was still experiencing severe OCD. Tried multiple medications including testosterone which was very helpful for reducing weight, anxiety and ADHD symptoms but not very much with OCD.  Then, [place] moved to [place] as a \"digital nomad\", OCD stayed. Started first relationship and \"relationship related OCD\" (is this the right relationship and whether to have kids or not) which contributed to break up.     After stopping testosterone, everything started to fall a part. Started risperidone 2.5 mg and it was partially helpful but induced additional 10 pounds of weight gain.     He returned to the [facility] because of taking care of his mental health.     He reports that his OCD is currently consuming 5-8 hours per day to OCD. Reports getting anxious, baseline 6-7 and gets to 10 with exposure during ERP. He reports being able to tolerate the exposure.      Wakes up 8:30 and work is getting complex from early in the morning with ruminations and mental " checking, concluding and they getting doubtful and try it again and again. He is still functional to get out of bed and do his morning routines, but if there is ay space the rumination interfere.     Therapy-wise, doing N-OCD exposure therapy while [place] was out of the state. He is not sure how it was helpful. Re started individual ERP again recently.     Getting exposed to his sister's kids has been helpful but he still has challenges in the out side environment with obsessions about kids.     Just prescribed Concerta, not taken yet. We hope this will help his OCD symptoms. We will check this next visit.      Recent Substance Use:  TOBACCO- no     CAFFEINE- coffee/ tea [will take a caffeine supplement before working out, but has not used this in the last two months.]  ALCOHOL- 5 month sober as it was worsening his OCD  CANNABIS- yes, less than monthly.    OPIOIDS- no         NARCAN KIT- no                OTHER ILLICIT DRUGS- none        Psychiatric History     Suicidal ideation- Yes, chronic   Suicide Attempt:   #- 0   Most Recent- 0  SIB- None   Psych Hosp- None      Psychiatric Medication Trials     Adequate dose/duration:  Escitalopram, 20mg >6mo  Sertraline, >300mg >4mo    Uncertain:  Fluoxetine  Paroxetine    Limited by side effects:  Bupropion  Clomipramine (major weight gain)  Mirtazapine (same)    Other:  Stimulants, eli Vyvanse, have been helpful in past.  Augmentation with risperidone helpful but caused weight gain  Augmentation with aripiprazole did not affect OCD  Augemntation withq uetiapine not helpful    He also reports benefit from testosterone supplementation.         Social/ Family History               [per patient report]                                                 1ea, 1ea     LIVING SITUATION- with   CHILDREN- no   SOCIAL SUPPORT / RELATIONSHIPS- living with his parents for now  Patient notes that his parents and friends are supports.      He states that his parents are the primary people who  he feel comfortable sharing his mental health symptoms with.     CULTURAL / SPIRITUAL- none provided     EDUCATION- Patient has a Bachelor's Degree  EMPLOYMENT- No job now.      FINANCIAL SUPPORT- Earned income        Trauma History (self-report)- no     Legal- no     FAMILY MENTAL HEALTH HX- yes, his father has a history of Bipolar/Schizoaffective Disorders and ADD; mother has depression, anxiety, and PTSD; oldest brother has Bipolar Disorder; the second oldest brother has OCD and depression; sister has depression, anxiety and ADD.       Medical / Surgical History     Patient Active Problem List   Diagnosis     Mood disorder (H)     Obsessive-compulsive disorder, unspecified type     Elevated liver enzymes     High glucose     Morbid obesity (H)     Mixed hyperlipidemia       History reviewed. No pertinent surgical history.      Medical Review of Systems                                                                                                 2, 10   Reviewed. No major concerns.        Metals Screen   Yes No Item    X Implanted or lodged metals in body    X Implanted surgical devices    X Metal containing facial or scalp tattoos    X Non removable piercings   Seizure Screen  Yes No Item    X Current Seizure Disorder?    X History of Seizure?     Does patient have a cochlear implant? ___N_______  Does patient have any shunts?___N________  Does patient have a pacemaker?___N_______  Does patient have a vagus nerve stimulator?___N_______  Does patient have a deep brain stimulator?____N______  Any other implanted device?________N________       Allergy   No known allergies     Current Medications     Current Outpatient Medications   Medication Sig Dispense Refill     LORazepam (ATIVAN) 1 MG tablet Take 1 mg by mouth.       MELATONIN PO Take by mouth nightly as needed       Omega-3 Fatty Acids (FISH OIL PO)        risperiDONE (RISPERDAL) 0.5 MG tablet Take 0.5 mg by mouth.       risperiDONE (RISPERDAL) 2 MG tablet  "Take 2 mg by mouth at bedtime.       sertraline (ZOLOFT) 100 MG tablet Take 200 mg by mouth.       THEANINE PO Take by mouth.          Vitals                                                                                                                        3, 3     BP 94/60   Pulse 78   Temp 98.1  F (36.7  C)   Ht 1.842 m (6' 0.5\")   Wt 86.4 kg (190 lb 6.4 oz)   SpO2 97%   BMI 25.47 kg/m        Mental Status Exam                                                                                   9, 14 cog gs     Alertness: alert  and oriented  Appearance: well groomed  Behavior/Demeanor: calm and cooperative while mildly guarded and anxious  Speech: WNR tone, speed and prosody  Language: intact  Psychomotor: normal or unremarkable  Mood: \"anxious\"  Affect: ; was congruent to mood; was congruent to content  Thought Process/Associations: unremarkable  Thought Content:  Reports preoccupations, obsessions  and magical thinking;  Denies suicidal ideation, violent ideation and delusions  Perception:  Reports none;  Denies auditory hallucinations and visual hallucinations  Insight: excellent  Judgment: fair  Cognition: (6) does  appear grossly intact; formal cognitive testing was not done  Gait and Station: unremarkable     Labs and Data     Rating Scales:    PHQ9 and YBOCS      YBOCS Today: O=15, C=12, total=27    PHQ9 Today:  18      6/26/2020     2:27 PM 7/31/2020    12:00 PM 11/5/2024     9:08 AM   PHQ-9 SCORE   PHQ-9 Total Score 10 19 9       Diagnosis and Assessment                                                                             m2, h3     Jevon Murillo is a 28 year old male with previous diagnosis of OCD, MDD and ADHD. OCD is of a fairly typical type, albeit with challenging checking rituals that are at least partially mental and appear to becoming more mental/internal with time.The OCD has been quite severe, with >3 hrs of impairment daily due to rituals.     He has historically been interested " in DBS. There are six basic criteria:   YBOCS persistently > 24: Yes, see above, has been for years.   Inadequate response to SRIs: Clearly documented as above   Inadequate response/interolance of clomipramine: Clearly documented.   Inadequate response to augmentation: Has tried risperidone, aripirazole, quetiapine without response.   Inadequate response to clonazepam: NOT met. Actually appears to respond and has not had a long trial of it. I don't think this will get him under 24, but he should still be on it.   Adquate dose/duration of ERP: Yes. Has done residential at [place]. Is getting back into therapy now.      On this basis, he probably IS a DBS candidate, though I would still want to see the clonazepam trial.      Suicide Risk Assessment:  Today Jevon Murillo reports that he has periods where he feels intensely depressed and has thoughts of wanting to die, though he does not feel that way today. He has thought about overdosing on heroin in the past but does not have any plans of suicide at this time. In addition, he has notable risk factors for self-harm, including feels trapped, lives alone/ isolated, severe anxiety and male. However, risk is mitigated by no h/o suicide attempt, no plan or intent, no h/o risky impulsive behavior, no access to lethal means, describes a safety plan, h/o seeking help when needed, symptom improvement, future oriented, feeling hopeful, none to minimal alcohol use , commitment to family, stable housing and good job situation. He has also removed medication stock pile recently. Therefore, based on all available evidence including the factors cited above, he does not appear to be at imminent risk for self-harm, does not meet criteria for a 72-hr hold, and therefore involuntary hospitalization will not be pursued at this  time.No change in level of care was recommended. Additional steps taken to minimize risk include plan for optimizing medications that would address distressing OCD  symptoms and Depression and considering strategies like Neuromodulation to target severe resistant symptoms and close collaborative care with primary providers.       [place] Prescription Monitoring Program [] review was not needed today.    PSYCHOTROPIC DRUG INTERACTIONS: none clinically relevant    Plan                                                                                                                     m2, h3     1) Obsessive compulsive disorder - mixed obsessions and compulsions      Depression NOS - moderate to severe    -- Medications:   Broadly, although I do not think medications are going to dramatically resolve his symptoms, there are things to try. In roughly this order:  A) He really needs to try scheduled clonazepam, e.g. 0.5mg-1mg BID. This is technically a requirement to proceed to DBS.    B) If the risperidone is not tolerable at this low dose, it should be replaced. Although Abilify did not work, another weight neutral agent (Geodon, Latuda, Rexulti) should be tried as a way to get him off the riseridone. The other option, which can work well in OCD patients, is cross tapering to haloperidol, which may also have fewer weight effects.    C) I would love to see his sertraline at 400mg if he can tolerate the titration. I do not think this is going to resolve the OCD, but it would help take the edge off.    He also wondered about restarting testosterone. I have no objection, it sounds like it helps, but this is not an evidence-based approahc to OCD and I do not offer long term medication management in my clinic due to a focus on interventions.      -- Psychotherapy:   Being engaged in ERP is a pre-requisite for DBS. He is restarting.    -- Procedures:  As above, I would be willing to consider him as a DBS candidate at this point. He is still not entirely certain if he wants to proceed to surgery and is going to ponder it. I will follow up with him in a few months.    We did also discuss  psychedelics for OCD, which do not yet have an evidence base sufficient to make me want to prescribe them.    -- Referrals: None      3)  RTC: As needed when ready to consider surgery      CRISIS NUMBERS:   Provided routinely in AVS.    Treatment Risk Statement:  The patient understands the risks, benefits, adverse effects and alternatives. Agrees to treatment with the capacity to do so. No medical contraindications to treatment. Agrees to call clinic for any problems. The patient understands to call 911 or go to the nearest ED if life threatening or urgent symptoms occur.        PROVIDER:  Bryan Hughes MD.     On day of service, time spent was    5 min on chart review  30 min with patient  30 min on note writing and follow up messages    Physician Attestation  I, Bryan Hughes MD, saw this patient and agree with the findings and plan of care as documented in the note.      Items personally reviewed/procedural attestation: I was present for and supervised the entire  procedure.    Bryan Hughes MD          Again, thank you for allowing me to participate in the care of your patient.      Sincerely,    Bryan Hughes MD